# Patient Record
Sex: MALE | Race: WHITE | Employment: OTHER | URBAN - METROPOLITAN AREA
[De-identification: names, ages, dates, MRNs, and addresses within clinical notes are randomized per-mention and may not be internally consistent; named-entity substitution may affect disease eponyms.]

---

## 2017-09-22 PROBLEM — C79.9 METASTASIS (HCC): Status: ACTIVE | Noted: 2017-09-22

## 2017-09-22 PROBLEM — R91.8 LUNG MASS: Status: ACTIVE | Noted: 2017-09-22

## 2017-09-25 PROBLEM — J90 PLEURAL EFFUSION: Status: ACTIVE | Noted: 2017-09-25

## 2018-01-01 ENCOUNTER — OFFICE VISIT (OUTPATIENT)
Dept: ENDOCRINOLOGY | Age: 63
End: 2018-01-01
Payer: COMMERCIAL

## 2018-01-01 ENCOUNTER — HOSPITAL ENCOUNTER (OUTPATIENT)
Age: 63
Discharge: HOME | End: 2018-12-06
Payer: COMMERCIAL

## 2018-01-01 ENCOUNTER — TELEPHONE (OUTPATIENT)
Dept: ENDOCRINOLOGY | Age: 63
End: 2018-01-01

## 2018-01-01 VITALS
BODY MASS INDEX: 21.84 KG/M2 | WEIGHT: 156 LBS | SYSTOLIC BLOOD PRESSURE: 120 MMHG | HEIGHT: 71 IN | OXYGEN SATURATION: 98 % | RESPIRATION RATE: 16 BRPM | DIASTOLIC BLOOD PRESSURE: 82 MMHG | HEART RATE: 134 BPM

## 2018-01-01 DIAGNOSIS — E05.90 HYPERTHYROIDISM: ICD-10-CM

## 2018-01-01 DIAGNOSIS — E05.90 HYPERTHYROIDISM: Primary | ICD-10-CM

## 2018-01-01 LAB
T3 TOTAL: 129.4 NG/DL (ref 80–200)
T4 FREE: 1.02 NG/DL (ref 0.93–1.7)
T4 TOTAL: 9 MCG/DL (ref 4.5–11.7)
THYROGLOBULIN AB: 737.1 IU/ML (ref 0–4)
THYROID PEROXIDASE (TPO) ABS: 58.4 IU/ML (ref 0–9)
THYROID STIMULATING IMMUNOGLOBULIN: 87 %
TSH SERPL DL<=0.05 MIU/L-ACNC: 2.55 UIU/ML (ref 0.27–4.2)

## 2018-01-01 PROCEDURE — 86376 MICROSOMAL ANTIBODY EACH: CPT

## 2018-01-01 PROCEDURE — 99213 OFFICE O/P EST LOW 20 MIN: CPT | Performed by: INTERNAL MEDICINE

## 2018-01-01 PROCEDURE — 84480 ASSAY TRIIODOTHYRONINE (T3): CPT

## 2018-01-01 PROCEDURE — 86800 THYROGLOBULIN ANTIBODY: CPT

## 2018-01-01 PROCEDURE — 84443 ASSAY THYROID STIM HORMONE: CPT

## 2018-01-01 PROCEDURE — 84445 ASSAY OF TSI GLOBULIN: CPT

## 2018-01-01 PROCEDURE — 36415 COLL VENOUS BLD VENIPUNCTURE: CPT

## 2018-01-01 PROCEDURE — 84439 ASSAY OF FREE THYROXINE: CPT

## 2018-12-06 NOTE — LETTER
Patient reports feeling hot and Wt loss but denied palpitations, swelling in the area of the thyroid gland, nervousness, anxiety, irritability or tremor     Denied any previous h/o or family history of thyroid disease       PAST MEDICAL HISTORY   Past Medical History:   Diagnosis Date    CAD (coronary artery disease)     [facility]     Hyperlipidemia     Hypertension     [facility] ([facility]     MI (mitral incompetence)      PAST SURGICAL HISTORY   Past Surgical History:   Procedure Laterality Date    ANGIOPLASTY  03/2017    lower legs    CORONARY ANGIOPLASTY WITH STENT PLACEMENT  2009    stents placed    LUNG BIOPSY      THORACENTESIS Left 09/24/2017     SOCIAL HISTORY   Social History     Social History    Marital status: Single     Spouse name: N/A    Number of children: N/A    Years of education: N/A     Occupational History    Not on file.      Social History Main Topics    Smoking status: Former Smoker     Packs/day: 2.00     Years: 45.00     Types: Cigarettes     Start date: 9/23/1971     Quit date: 9/15/2017    Smokeless tobacco: Never Used    Alcohol use 3.6 oz/week     6 Cans of beer per week      Comment: wkly-no acohol in six weeks    Drug use: No    Sexual activity: Not on file     Other Topics Concern    Not on file     Social History Narrative    No narrative on file     FAMILY HISTORY   Family History   Problem Relation Age of Onset    Cancer Mother         colon     ALLERGIES AND DRUG REACTIONS   No Known Allergies    CURRENT MEDICATIONS     Current Outpatient Prescriptions   Medication Sig Dispense Refill    HYDROcodone-acetaminophen ([facility]) 5-325 MG per tablet take 1 tablet by mouth every 4 to 6 hours if needed for pain  0    aspirin 81 MG chewable tablet Take 81 mg by mouth daily      METOPROLOL SUCCINATE ER PO Take 25 mg by mouth 2 times daily       atorvastatin (LIPITOR) 40 MG tablet Take 40 mg by mouth daily

## 2018-12-06 NOTE — PROGRESS NOTES
/82   Pulse 134   Resp 16   Ht 5' 11\" (1.803 m)   Wt 156 lb (70.8 kg)   SpO2 98%   BMI 21.76 kg/m²   BP Readings from Last 4 Encounters:   12/06/18 120/82   10/19/17 110/60   10/12/17 (!) 145/85   09/25/17 119/69     Wt Readings from Last 6 Encounters:   12/06/18 156 lb (70.8 kg)   10/19/17 178 lb 12.8 oz (81.1 kg)   09/25/17 190 lb 14.4 oz (86.6 kg)   06/17/15 173 lb (78.5 kg)       Physical examination:  General: awake alert, oriented x3, no abnormal position or movements. HEENT: normocephalic non traumatic, no exophthalmos, no lid lag, no lid retraction   Neck: supple, no LN enlargement, no thyromegaly, no thyroid tenderness, no thyroid bruit, no JVD. Pulm: Clear equal air entry no added sounds, no wheezing or rhonchi    CVS: S1 + S2, no murmur, no heave. Dorsalis pedis pulse palpable   Abd: soft lax, no tenderness, no organomegaly, audible bowel sounds. Skin: warm, no lesions, no rash.  No palmar erythema, no onycholysis, no pretibial Myxoedema, no acropachy    Neuro: CN intact, sensation notmal , muscle power normal. No tremors   Psych: normal mood, and affect    Review of Laboratory Data:  I have reviewed the following:  10/2/2018: WBC 6.4, Plt 350, Hb 11.5,, Ca 9.2, Cr 0.77, ALT 26, AST 23, GFR >60  Lab Results   Component Value Date/Time    WBC 8.6 09/25/2017 04:59 AM    RBC 4.43 09/25/2017 04:59 AM    HGB 12.8 09/25/2017 04:59 AM    HCT 38.1 09/25/2017 04:59 AM    MCV 86.0 09/25/2017 04:59 AM    MCH 28.9 09/25/2017 04:59 AM    MCHC 33.6 09/25/2017 04:59 AM    RDW 11.7 09/25/2017 04:59 AM     09/25/2017 04:59 AM    MPV 9.0 09/25/2017 04:59 AM      Lab Results   Component Value Date/Time     09/25/2017 04:59 AM    K 3.5 09/25/2017 04:59 AM    CO2 27 09/25/2017 04:59 AM    BUN 6 (L) 09/25/2017 04:59 AM    CALCIUM 8.3 (L) 09/25/2017 04:59 AM      No results found for: TSH, T4FREE, G8XZOTU, FT3, Q3LCVQA, TSI, TPOABS, THGAB  Lab Results   Component Value Date    GLUCOSE 117

## 2019-01-01 ENCOUNTER — HOSPITAL ENCOUNTER (INPATIENT)
Age: 64
LOS: 4 days | DRG: 720 | End: 2019-05-14
Attending: EMERGENCY MEDICINE | Admitting: INTERNAL MEDICINE
Payer: COMMERCIAL

## 2019-01-01 ENCOUNTER — HOSPITAL ENCOUNTER (EMERGENCY)
Age: 64
Discharge: HOME | End: 2019-04-15
Attending: EMERGENCY MEDICINE
Payer: COMMERCIAL

## 2019-01-01 ENCOUNTER — ANESTHESIA EVENT (OUTPATIENT)
Dept: OPERATING ROOM | Age: 64
DRG: 314 | End: 2019-01-01
Payer: COMMERCIAL

## 2019-01-01 ENCOUNTER — HOSPITAL ENCOUNTER (INPATIENT)
Age: 64
LOS: 6 days | Discharge: HOME HEALTH | DRG: 314 | End: 2019-05-05
Attending: EMERGENCY MEDICINE | Admitting: INTERNAL MEDICINE
Payer: COMMERCIAL

## 2019-01-01 ENCOUNTER — APPOINTMENT (OUTPATIENT)
Dept: CT IMAGING | Age: 64
End: 2019-01-01
Payer: COMMERCIAL

## 2019-01-01 ENCOUNTER — APPOINTMENT (OUTPATIENT)
Dept: GENERAL RADIOLOGY | Age: 64
DRG: 314 | End: 2019-01-01
Payer: COMMERCIAL

## 2019-01-01 ENCOUNTER — APPOINTMENT (OUTPATIENT)
Dept: ULTRASOUND IMAGING | Age: 64
DRG: 720 | End: 2019-01-01
Payer: COMMERCIAL

## 2019-01-01 ENCOUNTER — ANESTHESIA (OUTPATIENT)
Dept: OPERATING ROOM | Age: 64
DRG: 314 | End: 2019-01-01
Payer: COMMERCIAL

## 2019-01-01 ENCOUNTER — APPOINTMENT (OUTPATIENT)
Dept: GENERAL RADIOLOGY | Age: 64
DRG: 720 | End: 2019-01-01
Payer: COMMERCIAL

## 2019-01-01 ENCOUNTER — APPOINTMENT (OUTPATIENT)
Dept: GENERAL RADIOLOGY | Age: 64
End: 2019-01-01
Payer: COMMERCIAL

## 2019-01-01 ENCOUNTER — APPOINTMENT (OUTPATIENT)
Dept: CT IMAGING | Age: 64
DRG: 314 | End: 2019-01-01
Payer: COMMERCIAL

## 2019-01-01 ENCOUNTER — TELEPHONE (OUTPATIENT)
Dept: FAMILY MEDICINE CLINIC | Age: 64
End: 2019-01-01

## 2019-01-01 VITALS
HEART RATE: 110 BPM | RESPIRATION RATE: 16 BRPM | WEIGHT: 144 LBS | HEIGHT: 72 IN | TEMPERATURE: 98.1 F | OXYGEN SATURATION: 99 % | BODY MASS INDEX: 19.5 KG/M2 | SYSTOLIC BLOOD PRESSURE: 113 MMHG | DIASTOLIC BLOOD PRESSURE: 74 MMHG

## 2019-01-01 VITALS
SYSTOLIC BLOOD PRESSURE: 92 MMHG | TEMPERATURE: 97.7 F | HEIGHT: 72 IN | OXYGEN SATURATION: 99 % | RESPIRATION RATE: 16 BRPM | DIASTOLIC BLOOD PRESSURE: 63 MMHG | HEART RATE: 78 BPM | WEIGHT: 174.3 LBS | BODY MASS INDEX: 23.61 KG/M2

## 2019-01-01 VITALS
HEIGHT: 72 IN | DIASTOLIC BLOOD PRESSURE: 56 MMHG | TEMPERATURE: 97.6 F | OXYGEN SATURATION: 96 % | WEIGHT: 180.7 LBS | SYSTOLIC BLOOD PRESSURE: 90 MMHG | BODY MASS INDEX: 24.47 KG/M2

## 2019-01-01 VITALS — DIASTOLIC BLOOD PRESSURE: 61 MMHG | OXYGEN SATURATION: 100 % | SYSTOLIC BLOOD PRESSURE: 95 MMHG

## 2019-01-01 DIAGNOSIS — R91.8 LUNG MASS: ICD-10-CM

## 2019-01-01 DIAGNOSIS — E86.0 DEHYDRATION: Primary | ICD-10-CM

## 2019-01-01 DIAGNOSIS — L03.115 CELLULITIS OF RIGHT LOWER EXTREMITY: Primary | ICD-10-CM

## 2019-01-01 DIAGNOSIS — A41.9 SEPTICEMIA (HCC): Primary | ICD-10-CM

## 2019-01-01 DIAGNOSIS — R53.1 GENERALIZED WEAKNESS: ICD-10-CM

## 2019-01-01 DIAGNOSIS — S98.131A AMPUTATED TOE OF RIGHT FOOT (HCC): Primary | ICD-10-CM

## 2019-01-01 DIAGNOSIS — E87.20 LACTIC ACID ACIDOSIS: ICD-10-CM

## 2019-01-01 DIAGNOSIS — E11.8 DIABETIC FOOT (HCC): ICD-10-CM

## 2019-01-01 DIAGNOSIS — R11.0 NAUSEA: ICD-10-CM

## 2019-01-01 DIAGNOSIS — E87.6 HYPOKALEMIA: ICD-10-CM

## 2019-01-01 LAB
ABO/RH: NORMAL
ALBUMIN SERPL-MCNC: 1.7 G/DL (ref 3.5–5.2)
ALBUMIN SERPL-MCNC: 1.8 G/DL (ref 3.5–5.2)
ALBUMIN SERPL-MCNC: 2.5 G/DL (ref 3.5–5.2)
ALBUMIN SERPL-MCNC: 2.9 G/DL (ref 3.5–5.2)
ALBUMIN SERPL-MCNC: 3.2 G/DL (ref 3.5–5.2)
ALP BLD-CCNC: 272 U/L (ref 40–129)
ALP BLD-CCNC: 284 U/L (ref 40–129)
ALP BLD-CCNC: 406 U/L (ref 40–129)
ALP BLD-CCNC: 412 U/L (ref 40–129)
ALP BLD-CCNC: 415 U/L (ref 40–129)
ALP BLD-CCNC: 483 U/L (ref 40–129)
ALP BLD-CCNC: 530 U/L (ref 40–129)
ALT SERPL-CCNC: 123 U/L (ref 0–40)
ALT SERPL-CCNC: 28 U/L (ref 0–40)
ALT SERPL-CCNC: 29 U/L (ref 0–40)
ALT SERPL-CCNC: 36 U/L (ref 0–40)
ALT SERPL-CCNC: 60 U/L (ref 0–40)
ALT SERPL-CCNC: 62 U/L (ref 0–40)
ALT SERPL-CCNC: 91 U/L (ref 0–40)
AMMONIA: 62.6 UMOL/L (ref 16–60)
ANAEROBIC CULTURE: NORMAL
ANION GAP SERPL CALCULATED.3IONS-SCNC: 10 MMOL/L (ref 7–16)
ANION GAP SERPL CALCULATED.3IONS-SCNC: 11 MMOL/L (ref 7–16)
ANION GAP SERPL CALCULATED.3IONS-SCNC: 13 MMOL/L (ref 7–16)
ANION GAP SERPL CALCULATED.3IONS-SCNC: 14 MMOL/L (ref 7–16)
ANION GAP SERPL CALCULATED.3IONS-SCNC: 15 MMOL/L (ref 7–16)
ANION GAP SERPL CALCULATED.3IONS-SCNC: 17 MMOL/L (ref 7–16)
ANION GAP SERPL CALCULATED.3IONS-SCNC: 8 MMOL/L (ref 7–16)
ANION GAP SERPL CALCULATED.3IONS-SCNC: 8 MMOL/L (ref 7–16)
ANISOCYTOSIS: ABNORMAL
ANTIBODY SCREEN: NORMAL
ANTISTREPTOLYSIN-O: 66 IU/ML (ref 0–200)
ANTISTREPTOLYSIN-O: 71 IU/ML (ref 0–200)
APTT: 120 SEC (ref 24.5–35.1)
APTT: 181.6 SEC (ref 24.5–35.1)
APTT: 44.3 SEC (ref 24.5–35.1)
APTT: 49.3 SEC (ref 24.5–35.1)
APTT: 56.3 SEC (ref 24.5–35.1)
APTT: >240 SEC (ref 24.5–35.1)
AST SERPL-CCNC: 120 U/L (ref 0–39)
AST SERPL-CCNC: 122 U/L (ref 0–39)
AST SERPL-CCNC: 172 U/L (ref 0–39)
AST SERPL-CCNC: 44 U/L (ref 0–39)
AST SERPL-CCNC: 47 U/L (ref 0–39)
AST SERPL-CCNC: 55 U/L (ref 0–39)
AST SERPL-CCNC: 99 U/L (ref 0–39)
BASOPHILIC STIPPLING: ABNORMAL
BASOPHILS ABSOLUTE: 0 E9/L (ref 0–0.2)
BASOPHILS ABSOLUTE: 0.01 E9/L (ref 0–0.2)
BASOPHILS ABSOLUTE: 0.04 E9/L (ref 0–0.2)
BASOPHILS ABSOLUTE: 0.05 E9/L (ref 0–0.2)
BASOPHILS ABSOLUTE: 0.33 E9/L (ref 0–0.2)
BASOPHILS RELATIVE PERCENT: 0 % (ref 0–2)
BASOPHILS RELATIVE PERCENT: 0.1 % (ref 0–2)
BASOPHILS RELATIVE PERCENT: 0.1 % (ref 0–2)
BASOPHILS RELATIVE PERCENT: 0.3 % (ref 0–2)
BASOPHILS RELATIVE PERCENT: 1.7 % (ref 0–2)
BILIRUB SERPL-MCNC: 1.2 MG/DL (ref 0–1.2)
BILIRUB SERPL-MCNC: 1.2 MG/DL (ref 0–1.2)
BILIRUB SERPL-MCNC: 1.5 MG/DL (ref 0–1.2)
BILIRUB SERPL-MCNC: 1.5 MG/DL (ref 0–1.2)
BILIRUB SERPL-MCNC: 1.6 MG/DL (ref 0–1.2)
BILIRUB SERPL-MCNC: 1.7 MG/DL (ref 0–1.2)
BILIRUB SERPL-MCNC: 2.2 MG/DL (ref 0–1.2)
BILIRUBIN DIRECT: 0.5 MG/DL (ref 0–0.3)
BILIRUBIN DIRECT: 0.8 MG/DL (ref 0–0.3)
BILIRUBIN DIRECT: 0.8 MG/DL (ref 0–0.3)
BILIRUBIN DIRECT: 0.9 MG/DL (ref 0–0.3)
BILIRUBIN DIRECT: 1.1 MG/DL (ref 0–0.3)
BILIRUBIN URINE: NEGATIVE
BILIRUBIN, INDIRECT: 0.4 MG/DL (ref 0–1)
BILIRUBIN, INDIRECT: 0.7 MG/DL (ref 0–1)
BILIRUBIN, INDIRECT: 1.1 MG/DL (ref 0–1)
BLASTS RELATIVE PERCENT: 0.9 % (ref 0–0)
BLOOD BANK DISPENSE STATUS: NORMAL
BLOOD BANK DISPENSE STATUS: NORMAL
BLOOD BANK PRODUCT CODE: NORMAL
BLOOD BANK PRODUCT CODE: NORMAL
BLOOD CULTURE, ROUTINE: NORMAL
BLOOD, URINE: NEGATIVE
BPU ID: NORMAL
BPU ID: NORMAL
BUN BLDV-MCNC: 10 MG/DL (ref 8–23)
BUN BLDV-MCNC: 12 MG/DL (ref 8–23)
BUN BLDV-MCNC: 37 MG/DL (ref 8–23)
BUN BLDV-MCNC: 39 MG/DL (ref 8–23)
BUN BLDV-MCNC: 40 MG/DL (ref 8–23)
BUN BLDV-MCNC: 44 MG/DL (ref 8–23)
BUN BLDV-MCNC: 8 MG/DL (ref 8–23)
BUN BLDV-MCNC: 9 MG/DL (ref 8–23)
BURR CELLS: ABNORMAL
C-REACTIVE PROTEIN: 10 MG/DL (ref 0–0.4)
C-REACTIVE PROTEIN: 7.4 MG/DL (ref 0–0.4)
C-REACTIVE PROTEIN: 9.4 MG/DL (ref 0–0.4)
CALCIUM IONIZED: 0.95 MMOL/L (ref 1.15–1.33)
CALCIUM SERPL-MCNC: 6.4 MG/DL (ref 8.6–10.2)
CALCIUM SERPL-MCNC: 6.5 MG/DL (ref 8.6–10.2)
CALCIUM SERPL-MCNC: 6.6 MG/DL (ref 8.6–10.2)
CALCIUM SERPL-MCNC: 6.7 MG/DL (ref 8.6–10.2)
CALCIUM SERPL-MCNC: 6.7 MG/DL (ref 8.6–10.2)
CALCIUM SERPL-MCNC: 6.9 MG/DL (ref 8.6–10.2)
CALCIUM SERPL-MCNC: 7 MG/DL (ref 8.6–10.2)
CALCIUM SERPL-MCNC: 7.1 MG/DL (ref 8.6–10.2)
CALCIUM SERPL-MCNC: 7.2 MG/DL (ref 8.6–10.2)
CALCIUM SERPL-MCNC: 7.4 MG/DL (ref 8.6–10.2)
CALCIUM SERPL-MCNC: 7.6 MG/DL (ref 8.6–10.2)
CALCIUM SERPL-MCNC: 7.9 MG/DL (ref 8.6–10.2)
CHLORIDE BLD-SCNC: 100 MMOL/L (ref 98–107)
CHLORIDE BLD-SCNC: 100 MMOL/L (ref 98–107)
CHLORIDE BLD-SCNC: 89 MMOL/L (ref 98–107)
CHLORIDE BLD-SCNC: 94 MMOL/L (ref 98–107)
CHLORIDE BLD-SCNC: 95 MMOL/L (ref 98–107)
CHLORIDE BLD-SCNC: 95 MMOL/L (ref 98–107)
CHLORIDE BLD-SCNC: 96 MMOL/L (ref 98–107)
CHLORIDE BLD-SCNC: 97 MMOL/L (ref 98–107)
CHLORIDE BLD-SCNC: 99 MMOL/L (ref 98–107)
CHLORIDE URINE RANDOM: <20 MMOL/L
CLARITY: CLEAR
CO2: 21 MMOL/L (ref 22–29)
CO2: 21 MMOL/L (ref 22–29)
CO2: 22 MMOL/L (ref 22–29)
CO2: 23 MMOL/L (ref 22–29)
CO2: 26 MMOL/L (ref 22–29)
CO2: 26 MMOL/L (ref 22–29)
CO2: 27 MMOL/L (ref 22–29)
CO2: 28 MMOL/L (ref 22–29)
CO2: 29 MMOL/L (ref 22–29)
CO2: 33 MMOL/L (ref 22–29)
COLOR: YELLOW
CREAT SERPL-MCNC: 0.8 MG/DL (ref 0.7–1.2)
CREAT SERPL-MCNC: 0.8 MG/DL (ref 0.7–1.2)
CREAT SERPL-MCNC: 0.9 MG/DL (ref 0.7–1.2)
CREAT SERPL-MCNC: 1 MG/DL (ref 0.7–1.2)
CREAT SERPL-MCNC: 1 MG/DL (ref 0.7–1.2)
CREAT SERPL-MCNC: 2.1 MG/DL (ref 0.7–1.2)
CREAT SERPL-MCNC: 2.2 MG/DL (ref 0.7–1.2)
CREAT SERPL-MCNC: 2.3 MG/DL (ref 0.7–1.2)
CREAT SERPL-MCNC: 2.7 MG/DL (ref 0.7–1.2)
CREATININE URINE: 119 MG/DL (ref 40–278)
CREATININE URINE: 120 MG/DL (ref 40–278)
CULTURE, BLOOD 2: NORMAL
D DIMER: 1937 NG/ML DDU
D DIMER: 922 NG/ML DDU
DESCRIPTION BLOOD BANK: NORMAL
DESCRIPTION BLOOD BANK: NORMAL
EKG ATRIAL RATE: 104 BPM
EKG ATRIAL RATE: 118 BPM
EKG ATRIAL RATE: 145 BPM
EKG P AXIS: 10 DEGREES
EKG P AXIS: 30 DEGREES
EKG P AXIS: 42 DEGREES
EKG P-R INTERVAL: 120 MS
EKG P-R INTERVAL: 134 MS
EKG P-R INTERVAL: 168 MS
EKG Q-T INTERVAL: 286 MS
EKG Q-T INTERVAL: 352 MS
EKG Q-T INTERVAL: 360 MS
EKG QRS DURATION: 68 MS
EKG QRS DURATION: 70 MS
EKG QRS DURATION: 74 MS
EKG QTC CALCULATION (BAZETT): 444 MS
EKG QTC CALCULATION (BAZETT): 473 MS
EKG QTC CALCULATION (BAZETT): 493 MS
EKG R AXIS: 39 DEGREES
EKG R AXIS: 41 DEGREES
EKG R AXIS: 56 DEGREES
EKG T AXIS: 79 DEGREES
EKG T AXIS: 95 DEGREES
EKG T AXIS: 97 DEGREES
EKG VENTRICULAR RATE: 104 BPM
EKG VENTRICULAR RATE: 118 BPM
EKG VENTRICULAR RATE: 145 BPM
EOSINOPHILS ABSOLUTE: 0 E9/L (ref 0.05–0.5)
EOSINOPHILS ABSOLUTE: 0.05 E9/L (ref 0.05–0.5)
EOSINOPHILS ABSOLUTE: 0.1 E9/L (ref 0.05–0.5)
EOSINOPHILS ABSOLUTE: 0.49 E9/L (ref 0.05–0.5)
EOSINOPHILS ABSOLUTE: 0.69 E9/L (ref 0.05–0.5)
EOSINOPHILS RELATIVE PERCENT: 0 % (ref 0–6)
EOSINOPHILS RELATIVE PERCENT: 0.3 % (ref 0–6)
EOSINOPHILS RELATIVE PERCENT: 1.4 % (ref 0–6)
EOSINOPHILS RELATIVE PERCENT: 1.4 % (ref 0–6)
EOSINOPHILS RELATIVE PERCENT: 1.7 % (ref 0–6)
FERRITIN: 3059 NG/ML
GFR AFRICAN AMERICAN: 29
GFR AFRICAN AMERICAN: 35
GFR AFRICAN AMERICAN: 37
GFR AFRICAN AMERICAN: 39
GFR AFRICAN AMERICAN: >60
GFR NON-AFRICAN AMERICAN: 24 ML/MIN/1.73
GFR NON-AFRICAN AMERICAN: 29 ML/MIN/1.73
GFR NON-AFRICAN AMERICAN: 30 ML/MIN/1.73
GFR NON-AFRICAN AMERICAN: 32 ML/MIN/1.73
GFR NON-AFRICAN AMERICAN: >60 ML/MIN/1.73
GLUCOSE BLD-MCNC: 103 MG/DL (ref 74–99)
GLUCOSE BLD-MCNC: 106 MG/DL (ref 74–99)
GLUCOSE BLD-MCNC: 106 MG/DL (ref 74–99)
GLUCOSE BLD-MCNC: 111 MG/DL (ref 74–99)
GLUCOSE BLD-MCNC: 126 MG/DL (ref 74–99)
GLUCOSE BLD-MCNC: 129 MG/DL (ref 74–99)
GLUCOSE BLD-MCNC: 137 MG/DL (ref 74–99)
GLUCOSE BLD-MCNC: 139 MG/DL (ref 74–99)
GLUCOSE BLD-MCNC: 89 MG/DL (ref 74–99)
GLUCOSE BLD-MCNC: 90 MG/DL (ref 74–99)
GLUCOSE BLD-MCNC: 91 MG/DL (ref 74–99)
GLUCOSE BLD-MCNC: 97 MG/DL (ref 74–99)
GLUCOSE URINE: NEGATIVE MG/DL
GRAM STAIN ORDERABLE: NORMAL
HAV IGM SER IA-ACNC: NORMAL
HBA1C MFR BLD: 4.1 % (ref 4–5.6)
HCT VFR BLD CALC: 24.2 % (ref 37–54)
HCT VFR BLD CALC: 24.5 % (ref 37–54)
HCT VFR BLD CALC: 24.6 % (ref 37–54)
HCT VFR BLD CALC: 26.4 % (ref 37–54)
HCT VFR BLD CALC: 30.6 % (ref 37–54)
HCT VFR BLD CALC: 33.4 % (ref 37–54)
HCT VFR BLD CALC: 34 % (ref 37–54)
HCT VFR BLD CALC: 34.9 % (ref 37–54)
HCT VFR BLD CALC: 35.1 % (ref 37–54)
HCT VFR BLD CALC: 36.4 % (ref 37–54)
HCT VFR BLD CALC: 36.6 % (ref 37–54)
HCT VFR BLD CALC: 36.7 % (ref 37–54)
HCT VFR BLD CALC: 37.5 % (ref 37–54)
HCT VFR BLD CALC: 38.1 % (ref 37–54)
HEMOCCULT STL QL: ABNORMAL
HEMOGLOBIN: 10.5 G/DL (ref 12.5–16.5)
HEMOGLOBIN: 10.6 G/DL (ref 12.5–16.5)
HEMOGLOBIN: 10.8 G/DL (ref 12.5–16.5)
HEMOGLOBIN: 10.9 G/DL (ref 12.5–16.5)
HEMOGLOBIN: 11.2 G/DL (ref 12.5–16.5)
HEMOGLOBIN: 11.3 G/DL (ref 12.5–16.5)
HEMOGLOBIN: 11.5 G/DL (ref 12.5–16.5)
HEMOGLOBIN: 11.7 G/DL (ref 12.5–16.5)
HEMOGLOBIN: 12.2 G/DL (ref 12.5–16.5)
HEMOGLOBIN: 7.5 G/DL (ref 12.5–16.5)
HEMOGLOBIN: 7.6 G/DL (ref 12.5–16.5)
HEMOGLOBIN: 7.9 G/DL (ref 12.5–16.5)
HEMOGLOBIN: 8.6 G/DL (ref 12.5–16.5)
HEMOGLOBIN: 9.7 G/DL (ref 12.5–16.5)
HEPATITIS B CORE IGM ANTIBODY: NORMAL
HEPATITIS B SURFACE ANTIGEN INTERPRETATION: NORMAL
HEPATITIS C ANTIBODY INTERPRETATION: NORMAL
HYPOCHROMIA: ABNORMAL
IMMATURE GRANULOCYTES #: 0.1 E9/L
IMMATURE GRANULOCYTES #: 0.42 E9/L
IMMATURE GRANULOCYTES #: 0.66 E9/L
IMMATURE GRANULOCYTES %: 1.2 % (ref 0–5)
IMMATURE GRANULOCYTES %: 1.7 % (ref 0–5)
IMMATURE GRANULOCYTES %: 2.7 % (ref 0–5)
INR BLD: 4.6
INR BLD: 5.5
INR BLD: 6.3
IRON SATURATION: 85 % (ref 20–55)
IRON: 45 MCG/DL (ref 59–158)
KETONES, URINE: NEGATIVE MG/DL
LACTIC ACID: 3.9 MMOL/L (ref 0.5–2.2)
LACTIC ACID: 4 MMOL/L (ref 0.5–2.2)
LACTIC ACID: 4.5 MMOL/L (ref 0.5–2.2)
LACTIC ACID: 4.9 MMOL/L (ref 0.5–2.2)
LACTIC ACID: 5 MMOL/L (ref 0.5–2.2)
LACTIC ACID: 5.1 MMOL/L (ref 0.5–2.2)
LEUKOCYTE ESTERASE, URINE: NEGATIVE
LV EF: 68 %
LVEF MODALITY: NORMAL
LYMPHOCYTES ABSOLUTE: 0.59 E9/L (ref 1.5–4)
LYMPHOCYTES ABSOLUTE: 0.73 E9/L (ref 1.5–4)
LYMPHOCYTES ABSOLUTE: 0.77 E9/L (ref 1.5–4)
LYMPHOCYTES ABSOLUTE: 0.77 E9/L (ref 1.5–4)
LYMPHOCYTES ABSOLUTE: 0.83 E9/L (ref 1.5–4)
LYMPHOCYTES ABSOLUTE: 1.05 E9/L (ref 1.5–4)
LYMPHOCYTES ABSOLUTE: 2.42 E9/L (ref 1.5–4)
LYMPHOCYTES RELATIVE PERCENT: 1.7 % (ref 20–42)
LYMPHOCYTES RELATIVE PERCENT: 19.9 % (ref 20–42)
LYMPHOCYTES RELATIVE PERCENT: 2.2 % (ref 20–42)
LYMPHOCYTES RELATIVE PERCENT: 3 % (ref 20–42)
LYMPHOCYTES RELATIVE PERCENT: 3.1 % (ref 20–42)
LYMPHOCYTES RELATIVE PERCENT: 3.5 % (ref 20–42)
LYMPHOCYTES RELATIVE PERCENT: 6.1 % (ref 20–42)
MAGNESIUM: 1.7 MG/DL (ref 1.6–2.6)
MAGNESIUM: 1.8 MG/DL (ref 1.6–2.6)
MCH RBC QN AUTO: 31.7 PG (ref 26–35)
MCH RBC QN AUTO: 31.8 PG (ref 26–35)
MCH RBC QN AUTO: 32 PG (ref 26–35)
MCH RBC QN AUTO: 32.2 PG (ref 26–35)
MCH RBC QN AUTO: 32.2 PG (ref 26–35)
MCH RBC QN AUTO: 32.4 PG (ref 26–35)
MCH RBC QN AUTO: 32.5 PG (ref 26–35)
MCH RBC QN AUTO: 32.6 PG (ref 26–35)
MCH RBC QN AUTO: 32.6 PG (ref 26–35)
MCH RBC QN AUTO: 33.1 PG (ref 26–35)
MCH RBC QN AUTO: 33.1 PG (ref 26–35)
MCHC RBC AUTO-ENTMCNC: 30.6 % (ref 32–34.5)
MCHC RBC AUTO-ENTMCNC: 30.6 % (ref 32–34.5)
MCHC RBC AUTO-ENTMCNC: 30.7 % (ref 32–34.5)
MCHC RBC AUTO-ENTMCNC: 30.9 % (ref 32–34.5)
MCHC RBC AUTO-ENTMCNC: 30.9 % (ref 32–34.5)
MCHC RBC AUTO-ENTMCNC: 31 % (ref 32–34.5)
MCHC RBC AUTO-ENTMCNC: 31.1 % (ref 32–34.5)
MCHC RBC AUTO-ENTMCNC: 31.3 % (ref 32–34.5)
MCHC RBC AUTO-ENTMCNC: 31.7 % (ref 32–34.5)
MCHC RBC AUTO-ENTMCNC: 31.7 % (ref 32–34.5)
MCHC RBC AUTO-ENTMCNC: 32.1 % (ref 32–34.5)
MCHC RBC AUTO-ENTMCNC: 32.5 % (ref 32–34.5)
MCHC RBC AUTO-ENTMCNC: 32.6 % (ref 32–34.5)
MCV RBC AUTO: 102.2 FL (ref 80–99.9)
MCV RBC AUTO: 102.7 FL (ref 80–99.9)
MCV RBC AUTO: 102.9 FL (ref 80–99.9)
MCV RBC AUTO: 103.7 FL (ref 80–99.9)
MCV RBC AUTO: 103.8 FL (ref 80–99.9)
MCV RBC AUTO: 104 FL (ref 80–99.9)
MCV RBC AUTO: 104 FL (ref 80–99.9)
MCV RBC AUTO: 104.4 FL (ref 80–99.9)
MCV RBC AUTO: 104.8 FL (ref 80–99.9)
MCV RBC AUTO: 105.2 FL (ref 80–99.9)
MCV RBC AUTO: 105.5 FL (ref 80–99.9)
MCV RBC AUTO: 99.6 FL (ref 80–99.9)
MCV RBC AUTO: 99.7 FL (ref 80–99.9)
MICROALBUMIN UR-MCNC: 98.5 MG/L
MICROALBUMIN/CREAT UR-RTO: 82.1 (ref 0–30)
MONOCYTES ABSOLUTE: 0.29 E9/L (ref 0.1–0.95)
MONOCYTES ABSOLUTE: 0.81 E9/L (ref 0.1–0.95)
MONOCYTES ABSOLUTE: 1.53 E9/L (ref 0.1–0.95)
MONOCYTES ABSOLUTE: 1.54 E9/L (ref 0.1–0.95)
MONOCYTES ABSOLUTE: 1.73 E9/L (ref 0.1–0.95)
MONOCYTES ABSOLUTE: 2.52 E9/L (ref 0.1–0.95)
MONOCYTES ABSOLUTE: 2.56 E9/L (ref 0.1–0.95)
MONOCYTES RELATIVE PERCENT: 1.7 % (ref 2–12)
MONOCYTES RELATIVE PERCENT: 13 % (ref 2–12)
MONOCYTES RELATIVE PERCENT: 3.5 % (ref 2–12)
MONOCYTES RELATIVE PERCENT: 5.1 % (ref 2–12)
MONOCYTES RELATIVE PERCENT: 6.7 % (ref 2–12)
MONOCYTES RELATIVE PERCENT: 7.9 % (ref 2–12)
MONOCYTES RELATIVE PERCENT: 7.9 % (ref 2–12)
MYELOCYTE PERCENT: 0.9 % (ref 0–0)
NEUTROPHILS ABSOLUTE: 16.55 E9/L (ref 1.8–7.3)
NEUTROPHILS ABSOLUTE: 16.7 E9/L (ref 1.8–7.3)
NEUTROPHILS ABSOLUTE: 2.48 E9/L (ref 1.8–7.3)
NEUTROPHILS ABSOLUTE: 30.5 E9/L (ref 1.8–7.3)
NEUTROPHILS ABSOLUTE: 33.65 E9/L (ref 1.8–7.3)
NEUTROPHILS ABSOLUTE: 36.27 E9/L (ref 1.8–7.3)
NEUTROPHILS ABSOLUTE: 36.39 E9/L (ref 1.8–7.3)
NEUTROPHILS RELATIVE PERCENT: 67.8 % (ref 43–80)
NEUTROPHILS RELATIVE PERCENT: 84 % (ref 43–80)
NEUTROPHILS RELATIVE PERCENT: 86 % (ref 43–80)
NEUTROPHILS RELATIVE PERCENT: 89 % (ref 43–80)
NEUTROPHILS RELATIVE PERCENT: 89.1 % (ref 43–80)
NEUTROPHILS RELATIVE PERCENT: 89.6 % (ref 43–80)
NEUTROPHILS RELATIVE PERCENT: 94.8 % (ref 43–80)
NITRITE, URINE: NEGATIVE
NUCLEATED RED BLOOD CELLS: 0 /100 WBC
ORGANISM: ABNORMAL
OSMOLALITY URINE: 392 MOSM/KG (ref 300–900)
OSMOLALITY: 284 MOSM/KG (ref 285–310)
OVALOCYTES: ABNORMAL
PAPPENHEIMER BODIES: ABNORMAL
PARATHYROID HORMONE INTACT: 483 PG/ML (ref 15–65)
PDW BLD-RTO: 17.4 FL (ref 11.5–15)
PDW BLD-RTO: 17.4 FL (ref 11.5–15)
PDW BLD-RTO: 17.5 FL (ref 11.5–15)
PDW BLD-RTO: 17.6 FL (ref 11.5–15)
PDW BLD-RTO: 17.6 FL (ref 11.5–15)
PDW BLD-RTO: 17.7 FL (ref 11.5–15)
PDW BLD-RTO: 17.7 FL (ref 11.5–15)
PDW BLD-RTO: 18 FL (ref 11.5–15)
PDW BLD-RTO: 18.2 FL (ref 11.5–15)
PDW BLD-RTO: 18.3 FL (ref 11.5–15)
PDW BLD-RTO: 18.4 FL (ref 11.5–15)
PDW BLD-RTO: 18.7 FL (ref 11.5–15)
PDW BLD-RTO: 19.8 FL (ref 11.5–15)
PH UA: 5 (ref 5–9)
PHOSPHORUS: 3.1 MG/DL (ref 2.5–4.5)
PHOSPHORUS: 3.4 MG/DL (ref 2.5–4.5)
PLATELET # BLD: 102 E9/L (ref 130–450)
PLATELET # BLD: 158 E9/L (ref 130–450)
PLATELET # BLD: 237 E9/L (ref 130–450)
PLATELET # BLD: 243 E9/L (ref 130–450)
PLATELET # BLD: 263 E9/L (ref 130–450)
PLATELET # BLD: 271 E9/L (ref 130–450)
PLATELET # BLD: 277 E9/L (ref 130–450)
PLATELET # BLD: 279 E9/L (ref 130–450)
PLATELET # BLD: 291 E9/L (ref 130–450)
PLATELET # BLD: 303 E9/L (ref 130–450)
PLATELET # BLD: 45 E9/L (ref 130–450)
PLATELET # BLD: 70 E9/L (ref 130–450)
PLATELET # BLD: 73 E9/L (ref 130–450)
PLATELET CONFIRMATION: NORMAL
PMV BLD AUTO: 10.3 FL (ref 7–12)
PMV BLD AUTO: 10.8 FL (ref 7–12)
PMV BLD AUTO: 10.8 FL (ref 7–12)
PMV BLD AUTO: 11.1 FL (ref 7–12)
PMV BLD AUTO: 11.2 FL (ref 7–12)
PMV BLD AUTO: 11.3 FL (ref 7–12)
PMV BLD AUTO: 11.3 FL (ref 7–12)
PMV BLD AUTO: 11.4 FL (ref 7–12)
PMV BLD AUTO: 12.6 FL (ref 7–12)
PMV BLD AUTO: 12.8 FL (ref 7–12)
PMV BLD AUTO: 13 FL (ref 7–12)
PMV BLD AUTO: 13 FL (ref 7–12)
PMV BLD AUTO: 9 FL (ref 7–12)
POIKILOCYTES: ABNORMAL
POLYCHROMASIA: ABNORMAL
POLYCHROMASIA: ABNORMAL
POTASSIUM REFLEX MAGNESIUM: 3.2 MMOL/L (ref 3.5–5)
POTASSIUM REFLEX MAGNESIUM: 3.8 MMOL/L (ref 3.5–5)
POTASSIUM REFLEX MAGNESIUM: 3.9 MMOL/L (ref 3.5–5)
POTASSIUM REFLEX MAGNESIUM: 4.1 MMOL/L (ref 3.5–5)
POTASSIUM REFLEX MAGNESIUM: 5.2 MMOL/L (ref 3.5–5)
POTASSIUM SERPL-SCNC: 2.9 MMOL/L (ref 3.5–5)
POTASSIUM SERPL-SCNC: 3 MMOL/L (ref 3.5–5)
POTASSIUM SERPL-SCNC: 3.1 MMOL/L (ref 3.5–5)
POTASSIUM SERPL-SCNC: 3.4 MMOL/L (ref 3.5–5)
POTASSIUM SERPL-SCNC: 3.4 MMOL/L (ref 3.5–5)
POTASSIUM SERPL-SCNC: 3.7 MMOL/L (ref 3.5–5)
POTASSIUM SERPL-SCNC: 3.8 MMOL/L (ref 3.5–5)
POTASSIUM SERPL-SCNC: 3.8 MMOL/L (ref 3.5–5)
POTASSIUM SERPL-SCNC: 3.9 MMOL/L (ref 3.5–5)
POTASSIUM, UR: 51.4 MMOL/L
PRO-BNP: 1089 PG/ML (ref 0–125)
PRO-BNP: 517 PG/ML (ref 0–125)
PRO-BNP: 786 PG/ML (ref 0–125)
PROCALCITONIN: 2.42 NG/ML (ref 0–0.08)
PROTEIN UA: NEGATIVE MG/DL
PROTHROMBIN TIME: 52.1 SEC (ref 9.3–12.4)
PROTHROMBIN TIME: 61.2 SEC (ref 9.3–12.4)
PROTHROMBIN TIME: 70 SEC (ref 9.3–12.4)
RBC # BLD: 2.33 E12/L (ref 3.8–5.8)
RBC # BLD: 2.39 E12/L (ref 3.8–5.8)
RBC # BLD: 2.65 E12/L (ref 3.8–5.8)
RBC # BLD: 2.98 E12/L (ref 3.8–5.8)
RBC # BLD: 3.2 E12/L (ref 3.8–5.8)
RBC # BLD: 3.28 E12/L (ref 3.8–5.8)
RBC # BLD: 3.33 E12/L (ref 3.8–5.8)
RBC # BLD: 3.38 E12/L (ref 3.8–5.8)
RBC # BLD: 3.52 E12/L (ref 3.8–5.8)
RBC # BLD: 3.53 E12/L (ref 3.8–5.8)
RBC # BLD: 3.56 E12/L (ref 3.8–5.8)
RBC # BLD: 3.61 E12/L (ref 3.8–5.8)
RBC # BLD: 3.76 E12/L (ref 3.8–5.8)
REASON FOR REJECTION: NORMAL
REASON FOR REJECTION: NORMAL
REJECTED TEST: NORMAL
REJECTED TEST: NORMAL
SCHISTOCYTES: ABNORMAL
SEDIMENTATION RATE, ERYTHROCYTE: 0 MM/HR (ref 0–15)
SEDIMENTATION RATE, ERYTHROCYTE: 11 MM/HR (ref 0–15)
SEDIMENTATION RATE, ERYTHROCYTE: 2 MM/HR (ref 0–15)
SODIUM BLD-SCNC: 126 MMOL/L (ref 132–146)
SODIUM BLD-SCNC: 129 MMOL/L (ref 132–146)
SODIUM BLD-SCNC: 131 MMOL/L (ref 132–146)
SODIUM BLD-SCNC: 131 MMOL/L (ref 132–146)
SODIUM BLD-SCNC: 132 MMOL/L (ref 132–146)
SODIUM BLD-SCNC: 132 MMOL/L (ref 132–146)
SODIUM BLD-SCNC: 133 MMOL/L (ref 132–146)
SODIUM BLD-SCNC: 135 MMOL/L (ref 132–146)
SODIUM BLD-SCNC: 136 MMOL/L (ref 132–146)
SODIUM BLD-SCNC: 137 MMOL/L (ref 132–146)
SODIUM BLD-SCNC: 137 MMOL/L (ref 132–146)
SODIUM BLD-SCNC: 138 MMOL/L (ref 132–146)
SODIUM URINE: <20 MMOL/L
SPECIFIC GRAVITY UA: 1.01 (ref 1–1.03)
TOTAL IRON BINDING CAPACITY: 53 MCG/DL (ref 250–450)
TOTAL PROTEIN: 3.8 G/DL (ref 6.4–8.3)
TOTAL PROTEIN: 3.8 G/DL (ref 6.4–8.3)
TOTAL PROTEIN: 3.9 G/DL (ref 6.4–8.3)
TOTAL PROTEIN: 4.2 G/DL (ref 6.4–8.3)
TOTAL PROTEIN: 4.4 G/DL (ref 6.4–8.3)
TOTAL PROTEIN: 4.5 G/DL (ref 6.4–8.3)
TOTAL PROTEIN: 4.7 G/DL (ref 6.4–8.3)
TROPONIN: <0.01 NG/ML (ref 0–0.03)
UREA NITROGEN, UR: 500 MG/DL (ref 800–1666)
UROBILINOGEN, URINE: 0.2 E.U./DL
VANCOMYCIN TROUGH: 18.9 MCG/ML (ref 5–16)
WBC # BLD: 19.2 E9/L (ref 4.5–11.5)
WBC # BLD: 19.7 E9/L (ref 4.5–11.5)
WBC # BLD: 23.5 E9/L (ref 4.5–11.5)
WBC # BLD: 25.8 E9/L (ref 4.5–11.5)
WBC # BLD: 28.2 E9/L (ref 4.5–11.5)
WBC # BLD: 3.7 E9/L (ref 4.5–11.5)
WBC # BLD: 30.4 E9/L (ref 4.5–11.5)
WBC # BLD: 30.4 E9/L (ref 4.5–11.5)
WBC # BLD: 30.5 E9/L (ref 4.5–11.5)
WBC # BLD: 34.2 E9/L (ref 4.5–11.5)
WBC # BLD: 37.8 E9/L (ref 4.5–11.5)
WBC # BLD: 38.3 E9/L (ref 4.5–11.5)
WBC # BLD: 40.3 E9/L (ref 4.5–11.5)
WOUND/ABSCESS: ABNORMAL
WOUND/ABSCESS: ABNORMAL

## 2019-01-01 PROCEDURE — 6370000000 HC RX 637 (ALT 250 FOR IP): Performed by: INTERNAL MEDICINE

## 2019-01-01 PROCEDURE — 71045 X-RAY EXAM CHEST 1 VIEW: CPT

## 2019-01-01 PROCEDURE — 99285 EMERGENCY DEPT VISIT HI MDM: CPT

## 2019-01-01 PROCEDURE — 99232 SBSQ HOSP IP/OBS MODERATE 35: CPT | Performed by: INTERNAL MEDICINE

## 2019-01-01 PROCEDURE — 2500000003 HC RX 250 WO HCPCS: Performed by: PODIATRIST

## 2019-01-01 PROCEDURE — 86060 ANTISTREPTOLYSIN O TITER: CPT

## 2019-01-01 PROCEDURE — 6370000000 HC RX 637 (ALT 250 FOR IP): Performed by: EMERGENCY MEDICINE

## 2019-01-01 PROCEDURE — 6360000002 HC RX W HCPCS: Performed by: INTERNAL MEDICINE

## 2019-01-01 PROCEDURE — 80076 HEPATIC FUNCTION PANEL: CPT

## 2019-01-01 PROCEDURE — 36591 DRAW BLOOD OFF VENOUS DEVICE: CPT

## 2019-01-01 PROCEDURE — 87205 SMEAR GRAM STAIN: CPT

## 2019-01-01 PROCEDURE — 36415 COLL VENOUS BLD VENIPUNCTURE: CPT

## 2019-01-01 PROCEDURE — 82248 BILIRUBIN DIRECT: CPT

## 2019-01-01 PROCEDURE — 85378 FIBRIN DEGRADE SEMIQUANT: CPT

## 2019-01-01 PROCEDURE — 99223 1ST HOSP IP/OBS HIGH 75: CPT | Performed by: INTERNAL MEDICINE

## 2019-01-01 PROCEDURE — 2580000003 HC RX 258: Performed by: INTERNAL MEDICINE

## 2019-01-01 PROCEDURE — 76700 US EXAM ABDOM COMPLETE: CPT

## 2019-01-01 PROCEDURE — 85025 COMPLETE CBC W/AUTO DIFF WBC: CPT

## 2019-01-01 PROCEDURE — 99233 SBSQ HOSP IP/OBS HIGH 50: CPT | Performed by: HOSPITALIST

## 2019-01-01 PROCEDURE — 1200000000 HC SEMI PRIVATE

## 2019-01-01 PROCEDURE — 2580000003 HC RX 258: Performed by: EMERGENCY MEDICINE

## 2019-01-01 PROCEDURE — 86901 BLOOD TYPING SEROLOGIC RH(D): CPT

## 2019-01-01 PROCEDURE — 85651 RBC SED RATE NONAUTOMATED: CPT

## 2019-01-01 PROCEDURE — 84100 ASSAY OF PHOSPHORUS: CPT

## 2019-01-01 PROCEDURE — 83880 ASSAY OF NATRIURETIC PEPTIDE: CPT

## 2019-01-01 PROCEDURE — 80053 COMPREHEN METABOLIC PANEL: CPT

## 2019-01-01 PROCEDURE — 80048 BASIC METABOLIC PNL TOTAL CA: CPT

## 2019-01-01 PROCEDURE — 93306 TTE W/DOPPLER COMPLETE: CPT

## 2019-01-01 PROCEDURE — 6370000000 HC RX 637 (ALT 250 FOR IP): Performed by: SPECIALIST

## 2019-01-01 PROCEDURE — 87075 CULTR BACTERIA EXCEPT BLOOD: CPT

## 2019-01-01 PROCEDURE — 96365 THER/PROPH/DIAG IV INF INIT: CPT

## 2019-01-01 PROCEDURE — 93005 ELECTROCARDIOGRAM TRACING: CPT | Performed by: INTERNAL MEDICINE

## 2019-01-01 PROCEDURE — 73620 X-RAY EXAM OF FOOT: CPT

## 2019-01-01 PROCEDURE — 85027 COMPLETE CBC AUTOMATED: CPT

## 2019-01-01 PROCEDURE — 94760 N-INVAS EAR/PLS OXIMETRY 1: CPT

## 2019-01-01 PROCEDURE — 2500000003 HC RX 250 WO HCPCS: Performed by: INTERNAL MEDICINE

## 2019-01-01 PROCEDURE — 83605 ASSAY OF LACTIC ACID: CPT

## 2019-01-01 PROCEDURE — 82044 UR ALBUMIN SEMIQUANTITATIVE: CPT

## 2019-01-01 PROCEDURE — 6360000002 HC RX W HCPCS: Performed by: SPECIALIST

## 2019-01-01 PROCEDURE — 6360000002 HC RX W HCPCS: Performed by: ANESTHESIOLOGY

## 2019-01-01 PROCEDURE — 83735 ASSAY OF MAGNESIUM: CPT

## 2019-01-01 PROCEDURE — 2580000003 HC RX 258: Performed by: SPECIALIST

## 2019-01-01 PROCEDURE — 3600000002 HC SURGERY LEVEL 2 BASE: Performed by: PODIATRIST

## 2019-01-01 PROCEDURE — 2060000000 HC ICU INTERMEDIATE R&B

## 2019-01-01 PROCEDURE — 80074 ACUTE HEPATITIS PANEL: CPT

## 2019-01-01 PROCEDURE — 84300 ASSAY OF URINE SODIUM: CPT

## 2019-01-01 PROCEDURE — 83550 IRON BINDING TEST: CPT

## 2019-01-01 PROCEDURE — 6360000002 HC RX W HCPCS: Performed by: EMERGENCY MEDICINE

## 2019-01-01 PROCEDURE — 83970 ASSAY OF PARATHORMONE: CPT

## 2019-01-01 PROCEDURE — 86140 C-REACTIVE PROTEIN: CPT

## 2019-01-01 PROCEDURE — P9059 PLASMA, FRZ BETWEEN 8-24HOUR: HCPCS

## 2019-01-01 PROCEDURE — 85730 THROMBOPLASTIN TIME PARTIAL: CPT

## 2019-01-01 PROCEDURE — 83036 HEMOGLOBIN GLYCOSYLATED A1C: CPT

## 2019-01-01 PROCEDURE — 85610 PROTHROMBIN TIME: CPT

## 2019-01-01 PROCEDURE — 82330 ASSAY OF CALCIUM: CPT

## 2019-01-01 PROCEDURE — 83540 ASSAY OF IRON: CPT

## 2019-01-01 PROCEDURE — 82728 ASSAY OF FERRITIN: CPT

## 2019-01-01 PROCEDURE — 93010 ELECTROCARDIOGRAM REPORT: CPT | Performed by: INTERNAL MEDICINE

## 2019-01-01 PROCEDURE — 97161 PT EVAL LOW COMPLEX 20 MIN: CPT

## 2019-01-01 PROCEDURE — 82570 ASSAY OF URINE CREATININE: CPT

## 2019-01-01 PROCEDURE — 7100000010 HC PHASE II RECOVERY - FIRST 15 MIN: Performed by: PODIATRIST

## 2019-01-01 PROCEDURE — 84484 ASSAY OF TROPONIN QUANT: CPT

## 2019-01-01 PROCEDURE — 82140 ASSAY OF AMMONIA: CPT

## 2019-01-01 PROCEDURE — 86923 COMPATIBILITY TEST ELECTRIC: CPT

## 2019-01-01 PROCEDURE — 73630 X-RAY EXAM OF FOOT: CPT

## 2019-01-01 PROCEDURE — 3700000001 HC ADD 15 MINUTES (ANESTHESIA): Performed by: PODIATRIST

## 2019-01-01 PROCEDURE — 81003 URINALYSIS AUTO W/O SCOPE: CPT

## 2019-01-01 PROCEDURE — 2580000003 HC RX 258: Performed by: NURSE PRACTITIONER

## 2019-01-01 PROCEDURE — 97165 OT EVAL LOW COMPLEX 30 MIN: CPT

## 2019-01-01 PROCEDURE — 85018 HEMOGLOBIN: CPT

## 2019-01-01 PROCEDURE — P9047 ALBUMIN (HUMAN), 25%, 50ML: HCPCS | Performed by: INTERNAL MEDICINE

## 2019-01-01 PROCEDURE — C9113 INJ PANTOPRAZOLE SODIUM, VIA: HCPCS | Performed by: INTERNAL MEDICINE

## 2019-01-01 PROCEDURE — 2700000000 HC OXYGEN THERAPY PER DAY

## 2019-01-01 PROCEDURE — 82436 ASSAY OF URINE CHLORIDE: CPT

## 2019-01-01 PROCEDURE — P9016 RBC LEUKOCYTES REDUCED: HCPCS

## 2019-01-01 PROCEDURE — 83935 ASSAY OF URINE OSMOLALITY: CPT

## 2019-01-01 PROCEDURE — 7100000011 HC PHASE II RECOVERY - ADDTL 15 MIN: Performed by: PODIATRIST

## 2019-01-01 PROCEDURE — 2500000003 HC RX 250 WO HCPCS: Performed by: ANESTHESIOLOGY

## 2019-01-01 PROCEDURE — 36430 TRANSFUSION BLD/BLD COMPNT: CPT

## 2019-01-01 PROCEDURE — 93970 EXTREMITY STUDY: CPT

## 2019-01-01 PROCEDURE — 3600000012 HC SURGERY LEVEL 2 ADDTL 15MIN: Performed by: PODIATRIST

## 2019-01-01 PROCEDURE — 86900 BLOOD TYPING SEROLOGIC ABO: CPT

## 2019-01-01 PROCEDURE — 6360000002 HC RX W HCPCS: Performed by: NURSE ANESTHETIST, CERTIFIED REGISTERED

## 2019-01-01 PROCEDURE — 87040 BLOOD CULTURE FOR BACTERIA: CPT

## 2019-01-01 PROCEDURE — 99222 1ST HOSP IP/OBS MODERATE 55: CPT | Performed by: INTERNAL MEDICINE

## 2019-01-01 PROCEDURE — 87070 CULTURE OTHR SPECIMN AEROBIC: CPT

## 2019-01-01 PROCEDURE — APPSS30 APP SPLIT SHARED TIME 16-30 MINUTES: Performed by: NURSE PRACTITIONER

## 2019-01-01 PROCEDURE — 85014 HEMATOCRIT: CPT

## 2019-01-01 PROCEDURE — 94761 N-INVAS EAR/PLS OXIMETRY MLT: CPT

## 2019-01-01 PROCEDURE — 0Y6P0Z0 DETACHMENT AT RIGHT 1ST TOE, COMPLETE, OPEN APPROACH: ICD-10-PCS | Performed by: PODIATRIST

## 2019-01-01 PROCEDURE — 93005 ELECTROCARDIOGRAM TRACING: CPT | Performed by: EMERGENCY MEDICINE

## 2019-01-01 PROCEDURE — 2580000003 HC RX 258: Performed by: ANESTHESIOLOGY

## 2019-01-01 PROCEDURE — 6370000000 HC RX 637 (ALT 250 FOR IP)

## 2019-01-01 PROCEDURE — 6360000004 HC RX CONTRAST MEDICATION: Performed by: RADIOLOGY

## 2019-01-01 PROCEDURE — 99239 HOSP IP/OBS DSCHRG MGMT >30: CPT | Performed by: INTERNAL MEDICINE

## 2019-01-01 PROCEDURE — 71275 CT ANGIOGRAPHY CHEST: CPT

## 2019-01-01 PROCEDURE — 99999 PR OFFICE/OUTPT VISIT,PROCEDURE ONLY: CPT | Performed by: INTERNAL MEDICINE

## 2019-01-01 PROCEDURE — 86850 RBC ANTIBODY SCREEN: CPT

## 2019-01-01 PROCEDURE — 88305 TISSUE EXAM BY PATHOLOGIST: CPT

## 2019-01-01 PROCEDURE — 84540 ASSAY OF URINE/UREA-N: CPT

## 2019-01-01 PROCEDURE — 84133 ASSAY OF URINE POTASSIUM: CPT

## 2019-01-01 PROCEDURE — 84145 PROCALCITONIN (PCT): CPT

## 2019-01-01 PROCEDURE — 71260 CT THORAX DX C+: CPT

## 2019-01-01 PROCEDURE — 96375 TX/PRO/DX INJ NEW DRUG ADDON: CPT

## 2019-01-01 PROCEDURE — 99233 SBSQ HOSP IP/OBS HIGH 50: CPT | Performed by: INTERNAL MEDICINE

## 2019-01-01 PROCEDURE — 3700000000 HC ANESTHESIA ATTENDED CARE: Performed by: PODIATRIST

## 2019-01-01 PROCEDURE — 2709999900 HC NON-CHARGEABLE SUPPLY: Performed by: PODIATRIST

## 2019-01-01 PROCEDURE — 88311 DECALCIFY TISSUE: CPT

## 2019-01-01 PROCEDURE — 83930 ASSAY OF BLOOD OSMOLALITY: CPT

## 2019-01-01 PROCEDURE — 80202 ASSAY OF VANCOMYCIN: CPT

## 2019-01-01 PROCEDURE — C1729 CATH, DRAINAGE: HCPCS | Performed by: PODIATRIST

## 2019-01-01 RX ORDER — SODIUM CHLORIDE 0.9 % (FLUSH) 0.9 %
10 SYRINGE (ML) INJECTION PRN
Status: DISCONTINUED | OUTPATIENT
Start: 2019-01-01 | End: 2019-01-01 | Stop reason: HOSPADM

## 2019-01-01 RX ORDER — POTASSIUM CHLORIDE 20 MEQ/1
40 TABLET, EXTENDED RELEASE ORAL PRN
Status: DISCONTINUED | OUTPATIENT
Start: 2019-01-01 | End: 2019-01-01 | Stop reason: HOSPADM

## 2019-01-01 RX ORDER — 0.9 % SODIUM CHLORIDE 0.9 %
250 INTRAVENOUS SOLUTION INTRAVENOUS ONCE
Status: COMPLETED | OUTPATIENT
Start: 2019-01-01 | End: 2019-01-01

## 2019-01-01 RX ORDER — 0.9 % SODIUM CHLORIDE 0.9 %
1000 INTRAVENOUS SOLUTION INTRAVENOUS ONCE
Status: COMPLETED | OUTPATIENT
Start: 2019-01-01 | End: 2019-01-01

## 2019-01-01 RX ORDER — MEPERIDINE HYDROCHLORIDE 25 MG/ML
12.5 INJECTION INTRAMUSCULAR; INTRAVENOUS; SUBCUTANEOUS EVERY 5 MIN PRN
Status: DISCONTINUED | OUTPATIENT
Start: 2019-01-01 | End: 2019-01-01 | Stop reason: HOSPADM

## 2019-01-01 RX ORDER — ONDANSETRON 2 MG/ML
4 INJECTION INTRAMUSCULAR; INTRAVENOUS EVERY 6 HOURS PRN
Status: DISCONTINUED | OUTPATIENT
Start: 2019-01-01 | End: 2019-01-01 | Stop reason: HOSPADM

## 2019-01-01 RX ORDER — DOXYCYCLINE HYCLATE 100 MG/1
100 CAPSULE ORAL EVERY 12 HOURS SCHEDULED
Status: DISCONTINUED | OUTPATIENT
Start: 2019-01-01 | End: 2019-01-01 | Stop reason: HOSPADM

## 2019-01-01 RX ORDER — LORAZEPAM 2 MG/ML
0.5 INJECTION INTRAMUSCULAR
Status: DISCONTINUED | OUTPATIENT
Start: 2019-01-01 | End: 2019-01-01 | Stop reason: HOSPADM

## 2019-01-01 RX ORDER — SODIUM CHLORIDE 9 MG/ML
INJECTION, SOLUTION INTRAVENOUS CONTINUOUS
Status: ACTIVE | OUTPATIENT
Start: 2019-01-01 | End: 2019-01-01

## 2019-01-01 RX ORDER — GABAPENTIN 300 MG/1
1 CAPSULE ORAL NIGHTLY
Refills: 0 | COMMUNITY
Start: 2019-01-01

## 2019-01-01 RX ORDER — 0.9 % SODIUM CHLORIDE 0.9 %
2000 INTRAVENOUS SOLUTION INTRAVENOUS ONCE
Status: COMPLETED | OUTPATIENT
Start: 2019-01-01 | End: 2019-01-01

## 2019-01-01 RX ORDER — PROMETHAZINE HYDROCHLORIDE 25 MG/ML
25 INJECTION, SOLUTION INTRAMUSCULAR; INTRAVENOUS PRN
Status: DISCONTINUED | OUTPATIENT
Start: 2019-01-01 | End: 2019-01-01 | Stop reason: HOSPADM

## 2019-01-01 RX ORDER — NITROGLYCERIN 0.4 MG/1
TABLET SUBLINGUAL
Refills: 0 | COMMUNITY
Start: 2019-01-01

## 2019-01-01 RX ORDER — OXYCODONE HYDROCHLORIDE 10 MG/1
1 TABLET ORAL
Refills: 0 | COMMUNITY
Start: 2019-01-01

## 2019-01-01 RX ORDER — GABAPENTIN 300 MG/1
300 CAPSULE ORAL NIGHTLY
Status: DISCONTINUED | OUTPATIENT
Start: 2019-01-01 | End: 2019-01-01 | Stop reason: HOSPADM

## 2019-01-01 RX ORDER — 0.9 % SODIUM CHLORIDE 0.9 %
200 INTRAVENOUS SOLUTION INTRAVENOUS ONCE
Status: COMPLETED | OUTPATIENT
Start: 2019-01-01 | End: 2019-01-01

## 2019-01-01 RX ORDER — LABETALOL HYDROCHLORIDE 5 MG/ML
5 INJECTION, SOLUTION INTRAVENOUS EVERY 10 MIN PRN
Status: DISCONTINUED | OUTPATIENT
Start: 2019-01-01 | End: 2019-01-01 | Stop reason: HOSPADM

## 2019-01-01 RX ORDER — ACETAMINOPHEN 325 MG/1
650 TABLET ORAL EVERY 4 HOURS PRN
Status: DISCONTINUED | OUTPATIENT
Start: 2019-01-01 | End: 2019-01-01 | Stop reason: HOSPADM

## 2019-01-01 RX ORDER — DIMETHICONE, OXYBENZONE, AND PADIMATE O 2; 2.5; 6.6 G/100G; G/100G; G/100G
STICK TOPICAL
Status: COMPLETED
Start: 2019-01-01 | End: 2019-01-01

## 2019-01-01 RX ORDER — GABAPENTIN 300 MG/1
300 CAPSULE ORAL NIGHTLY
Status: DISCONTINUED | OUTPATIENT
Start: 2019-01-01 | End: 2019-01-01

## 2019-01-01 RX ORDER — SODIUM CHLORIDE 450 MG/100ML
INJECTION, SOLUTION INTRAVENOUS CONTINUOUS
Status: DISCONTINUED | OUTPATIENT
Start: 2019-01-01 | End: 2019-01-01

## 2019-01-01 RX ORDER — POTASSIUM CHLORIDE 20 MEQ/1
40 TABLET, EXTENDED RELEASE ORAL ONCE
Status: COMPLETED | OUTPATIENT
Start: 2019-01-01 | End: 2019-01-01

## 2019-01-01 RX ORDER — 0.9 % SODIUM CHLORIDE 0.9 %
1000 INTRAVENOUS SOLUTION INTRAVENOUS ONCE
Status: DISCONTINUED | OUTPATIENT
Start: 2019-01-01 | End: 2019-01-01

## 2019-01-01 RX ORDER — MAGNESIUM SULFATE 1 G/100ML
1 INJECTION INTRAVENOUS PRN
Status: DISCONTINUED | OUTPATIENT
Start: 2019-01-01 | End: 2019-01-01 | Stop reason: HOSPADM

## 2019-01-01 RX ORDER — FUROSEMIDE 40 MG/1
1 TABLET ORAL EVERY MORNING
Refills: 0 | COMMUNITY
Start: 2019-01-01

## 2019-01-01 RX ORDER — ATORVASTATIN CALCIUM 40 MG/1
40 TABLET, FILM COATED ORAL NIGHTLY
Status: DISCONTINUED | OUTPATIENT
Start: 2019-01-01 | End: 2019-01-01 | Stop reason: HOSPADM

## 2019-01-01 RX ORDER — OXYCODONE HYDROCHLORIDE 5 MG/1
10 TABLET ORAL EVERY 4 HOURS PRN
Status: DISCONTINUED | OUTPATIENT
Start: 2019-01-01 | End: 2019-01-01 | Stop reason: HOSPADM

## 2019-01-01 RX ORDER — 0.9 % SODIUM CHLORIDE 0.9 %
30 INTRAVENOUS SOLUTION INTRAVENOUS ONCE
Status: COMPLETED | OUTPATIENT
Start: 2019-01-01 | End: 2019-01-01

## 2019-01-01 RX ORDER — 0.9 % SODIUM CHLORIDE 0.9 %
500 INTRAVENOUS SOLUTION INTRAVENOUS ONCE
Status: COMPLETED | OUTPATIENT
Start: 2019-01-01 | End: 2019-01-01

## 2019-01-01 RX ORDER — ALBUMIN (HUMAN) 12.5 G/50ML
25 SOLUTION INTRAVENOUS EVERY 6 HOURS
Status: COMPLETED | OUTPATIENT
Start: 2019-01-01 | End: 2019-01-01

## 2019-01-01 RX ORDER — PHYTONADIONE 5 MG/1
5 TABLET ORAL ONCE
Status: COMPLETED | OUTPATIENT
Start: 2019-01-01 | End: 2019-01-01

## 2019-01-01 RX ORDER — POTASSIUM CHLORIDE 7.45 MG/ML
10 INJECTION INTRAVENOUS PRN
Status: DISCONTINUED | OUTPATIENT
Start: 2019-01-01 | End: 2019-01-01 | Stop reason: HOSPADM

## 2019-01-01 RX ORDER — ONDANSETRON 2 MG/ML
4 INJECTION INTRAMUSCULAR; INTRAVENOUS ONCE
Status: DISCONTINUED | OUTPATIENT
Start: 2019-01-01 | End: 2019-01-01 | Stop reason: HOSPADM

## 2019-01-01 RX ORDER — SODIUM CHLORIDE 450 MG/100ML
1000 INJECTION, SOLUTION INTRAVENOUS CONTINUOUS
Status: DISCONTINUED | OUTPATIENT
Start: 2019-01-01 | End: 2019-01-01

## 2019-01-01 RX ORDER — BUPIVACAINE HYDROCHLORIDE 5 MG/ML
INJECTION, SOLUTION EPIDURAL; INTRACAUDAL PRN
Status: DISCONTINUED | OUTPATIENT
Start: 2019-01-01 | End: 2019-01-01 | Stop reason: ALTCHOICE

## 2019-01-01 RX ORDER — PROPOFOL 10 MG/ML
INJECTION, EMULSION INTRAVENOUS CONTINUOUS PRN
Status: DISCONTINUED | OUTPATIENT
Start: 2019-01-01 | End: 2019-01-01 | Stop reason: SDUPTHER

## 2019-01-01 RX ORDER — FOLIC ACID/MULTIVIT,IRON,MINER .4-18-35
1 TABLET,CHEWABLE ORAL WEEKLY
Status: DISCONTINUED | OUTPATIENT
Start: 2019-01-01 | End: 2019-01-01 | Stop reason: CLARIF

## 2019-01-01 RX ORDER — HEPARIN SODIUM 10000 [USP'U]/100ML
18 INJECTION, SOLUTION INTRAVENOUS CONTINUOUS
Status: DISCONTINUED | OUTPATIENT
Start: 2019-01-01 | End: 2019-01-01

## 2019-01-01 RX ORDER — PROPOFOL 10 MG/ML
INJECTION, EMULSION INTRAVENOUS PRN
Status: DISCONTINUED | OUTPATIENT
Start: 2019-01-01 | End: 2019-01-01 | Stop reason: SDUPTHER

## 2019-01-01 RX ORDER — SODIUM CHLORIDE 0.9 % (FLUSH) 0.9 %
10 SYRINGE (ML) INJECTION EVERY 12 HOURS SCHEDULED
Status: DISCONTINUED | OUTPATIENT
Start: 2019-01-01 | End: 2019-01-01 | Stop reason: HOSPADM

## 2019-01-01 RX ORDER — MIDAZOLAM HYDROCHLORIDE 1 MG/ML
INJECTION INTRAMUSCULAR; INTRAVENOUS PRN
Status: DISCONTINUED | OUTPATIENT
Start: 2019-01-01 | End: 2019-01-01 | Stop reason: SDUPTHER

## 2019-01-01 RX ORDER — FUROSEMIDE 40 MG/1
40 TABLET ORAL EVERY MORNING
Status: DISCONTINUED | OUTPATIENT
Start: 2019-01-01 | End: 2019-01-01 | Stop reason: HOSPADM

## 2019-01-01 RX ORDER — BUMETANIDE 0.25 MG/ML
1 INJECTION, SOLUTION INTRAMUSCULAR; INTRAVENOUS 2 TIMES DAILY
Status: DISCONTINUED | OUTPATIENT
Start: 2019-01-01 | End: 2019-01-01 | Stop reason: HOSPADM

## 2019-01-01 RX ORDER — POTASSIUM CHLORIDE 7.45 MG/ML
10 INJECTION INTRAVENOUS
Status: COMPLETED | OUTPATIENT
Start: 2019-01-01 | End: 2019-01-01

## 2019-01-01 RX ORDER — IBUPROFEN 400 MG/1
400 TABLET ORAL 3 TIMES DAILY
Status: DISPENSED | OUTPATIENT
Start: 2019-01-01 | End: 2019-01-01

## 2019-01-01 RX ORDER — M-VIT,TX,IRON,MINS/CALC/FOLIC 27MG-0.4MG
1 TABLET ORAL WEEKLY
Status: DISCONTINUED | OUTPATIENT
Start: 2019-01-01 | End: 2019-01-01 | Stop reason: HOSPADM

## 2019-01-01 RX ORDER — PHYTONADIONE 5 MG/1
2.5 TABLET ORAL ONCE
Status: COMPLETED | OUTPATIENT
Start: 2019-01-01 | End: 2019-01-01

## 2019-01-01 RX ORDER — PANTOPRAZOLE SODIUM 40 MG/10ML
40 INJECTION, POWDER, LYOPHILIZED, FOR SOLUTION INTRAVENOUS 2 TIMES DAILY
Status: DISCONTINUED | OUTPATIENT
Start: 2019-01-01 | End: 2019-01-01 | Stop reason: HOSPADM

## 2019-01-01 RX ORDER — HEPARIN SODIUM 1000 [USP'U]/ML
80 INJECTION, SOLUTION INTRAVENOUS; SUBCUTANEOUS PRN
Status: DISCONTINUED | OUTPATIENT
Start: 2019-01-01 | End: 2019-01-01

## 2019-01-01 RX ORDER — FENTANYL CITRATE 50 UG/ML
INJECTION, SOLUTION INTRAMUSCULAR; INTRAVENOUS PRN
Status: DISCONTINUED | OUTPATIENT
Start: 2019-01-01 | End: 2019-01-01 | Stop reason: SDUPTHER

## 2019-01-01 RX ORDER — DOCUSATE SODIUM 100 MG/1
100 CAPSULE, LIQUID FILLED ORAL DAILY
Status: DISCONTINUED | OUTPATIENT
Start: 2019-01-01 | End: 2019-01-01 | Stop reason: HOSPADM

## 2019-01-01 RX ORDER — HEPARIN SODIUM 1000 [USP'U]/ML
80 INJECTION, SOLUTION INTRAVENOUS; SUBCUTANEOUS ONCE
Status: COMPLETED | OUTPATIENT
Start: 2019-01-01 | End: 2019-01-01

## 2019-01-01 RX ORDER — TOLVAPTAN 15 MG/1
15 TABLET ORAL ONCE
Status: COMPLETED | OUTPATIENT
Start: 2019-01-01 | End: 2019-01-01

## 2019-01-01 RX ORDER — MIDODRINE HYDROCHLORIDE 5 MG/1
5 TABLET ORAL
Status: DISCONTINUED | OUTPATIENT
Start: 2019-01-01 | End: 2019-01-01 | Stop reason: HOSPADM

## 2019-01-01 RX ORDER — DOXYCYCLINE HYCLATE 100 MG/1
100 CAPSULE ORAL EVERY 12 HOURS SCHEDULED
Qty: 20 CAPSULE | Refills: 0 | Status: SHIPPED | OUTPATIENT
Start: 2019-01-01 | End: 2019-01-01

## 2019-01-01 RX ORDER — HEPARIN SODIUM 1000 [USP'U]/ML
40 INJECTION, SOLUTION INTRAVENOUS; SUBCUTANEOUS PRN
Status: DISCONTINUED | OUTPATIENT
Start: 2019-01-01 | End: 2019-01-01

## 2019-01-01 RX ORDER — FOLIC ACID/MULTIVIT,IRON,MINER .4-18-35
1 TABLET,CHEWABLE ORAL WEEKLY
Status: DISCONTINUED | OUTPATIENT
Start: 2019-01-01 | End: 2019-01-01 | Stop reason: HOSPADM

## 2019-01-01 RX ORDER — ASPIRIN 81 MG/1
81 TABLET, CHEWABLE ORAL
Status: DISCONTINUED | OUTPATIENT
Start: 2019-01-01 | End: 2019-01-01 | Stop reason: HOSPADM

## 2019-01-01 RX ORDER — CALCIUM CARBONATE 200(500)MG
750 TABLET,CHEWABLE ORAL 3 TIMES DAILY PRN
Status: DISCONTINUED | OUTPATIENT
Start: 2019-01-01 | End: 2019-01-01 | Stop reason: HOSPADM

## 2019-01-01 RX ADMIN — APIXABAN 10 MG: 5 TABLET, FILM COATED ORAL at 07:55

## 2019-01-01 RX ADMIN — FUROSEMIDE 40 MG: 40 TABLET ORAL at 09:46

## 2019-01-01 RX ADMIN — ATORVASTATIN CALCIUM 40 MG: 40 TABLET, FILM COATED ORAL at 21:07

## 2019-01-01 RX ADMIN — DOXYCYCLINE HYCLATE 100 MG: 100 CAPSULE ORAL at 21:30

## 2019-01-01 RX ADMIN — PANTOPRAZOLE SODIUM 40 MG: 40 INJECTION, POWDER, LYOPHILIZED, FOR SOLUTION INTRAVENOUS at 21:06

## 2019-01-01 RX ADMIN — PIPERACILLIN SODIUM AND TAZOBACTAM SODIUM 3.38 G: 3; .375 INJECTION, POWDER, LYOPHILIZED, FOR SOLUTION INTRAVENOUS at 18:14

## 2019-01-01 RX ADMIN — Medication 10 ML: at 00:46

## 2019-01-01 RX ADMIN — OXYCODONE HYDROCHLORIDE 10 MG: 5 TABLET ORAL at 19:59

## 2019-01-01 RX ADMIN — PIPERACILLIN SODIUM AND TAZOBACTAM SODIUM 3.38 G: 3; .375 INJECTION, POWDER, LYOPHILIZED, FOR SOLUTION INTRAVENOUS at 08:10

## 2019-01-01 RX ADMIN — DOXYCYCLINE HYCLATE 100 MG: 100 CAPSULE ORAL at 20:29

## 2019-01-01 RX ADMIN — ASPIRIN 81 MG 81 MG: 81 TABLET ORAL at 20:39

## 2019-01-01 RX ADMIN — DOXYCYCLINE HYCLATE 100 MG: 100 CAPSULE ORAL at 21:07

## 2019-01-01 RX ADMIN — OXYCODONE HYDROCHLORIDE 10 MG: 5 TABLET ORAL at 09:48

## 2019-01-01 RX ADMIN — PIPERACILLIN SODIUM AND TAZOBACTAM SODIUM 3.38 G: 3; .375 INJECTION, POWDER, LYOPHILIZED, FOR SOLUTION INTRAVENOUS at 01:00

## 2019-01-01 RX ADMIN — ONDANSETRON 4 MG: 2 INJECTION INTRAMUSCULAR; INTRAVENOUS at 08:57

## 2019-01-01 RX ADMIN — OXYCODONE HYDROCHLORIDE 10 MG: 5 TABLET ORAL at 05:20

## 2019-01-01 RX ADMIN — SODIUM CHLORIDE 1000 ML: 4.5 INJECTION, SOLUTION INTRAVENOUS at 20:40

## 2019-01-01 RX ADMIN — ALBUMIN (HUMAN) 25 G: 0.25 INJECTION, SOLUTION INTRAVENOUS at 01:26

## 2019-01-01 RX ADMIN — VANCOMYCIN HYDROCHLORIDE 1250 MG: 10 INJECTION, POWDER, LYOPHILIZED, FOR SOLUTION INTRAVENOUS at 11:20

## 2019-01-01 RX ADMIN — Medication 10 ML: at 21:07

## 2019-01-01 RX ADMIN — OXYCODONE HYDROCHLORIDE 10 MG: 5 TABLET ORAL at 15:02

## 2019-01-01 RX ADMIN — OXYCODONE HYDROCHLORIDE 10 MG: 5 TABLET ORAL at 18:47

## 2019-01-01 RX ADMIN — MIDODRINE HYDROCHLORIDE 5 MG: 5 TABLET ORAL at 11:56

## 2019-01-01 RX ADMIN — ALBUMIN (HUMAN) 25 G: 0.25 INJECTION, SOLUTION INTRAVENOUS at 12:04

## 2019-01-01 RX ADMIN — PHYTONADIONE 2.5 MG: 5 TABLET ORAL at 05:25

## 2019-01-01 RX ADMIN — FUROSEMIDE 40 MG: 40 TABLET ORAL at 10:30

## 2019-01-01 RX ADMIN — SODIUM CHLORIDE 2000 ML: 9 INJECTION, SOLUTION INTRAVENOUS at 18:39

## 2019-01-01 RX ADMIN — ATORVASTATIN CALCIUM 40 MG: 40 TABLET, FILM COATED ORAL at 21:23

## 2019-01-01 RX ADMIN — IOPAMIDOL 75 ML: 755 INJECTION, SOLUTION INTRAVENOUS at 17:41

## 2019-01-01 RX ADMIN — MIDAZOLAM HYDROCHLORIDE 2 MG: 1 INJECTION, SOLUTION INTRAMUSCULAR; INTRAVENOUS at 17:09

## 2019-01-01 RX ADMIN — SODIUM CHLORIDE: 9 INJECTION, SOLUTION INTRAVENOUS at 01:26

## 2019-01-01 RX ADMIN — GABAPENTIN 300 MG: 300 CAPSULE ORAL at 20:29

## 2019-01-01 RX ADMIN — OXYCODONE HYDROCHLORIDE 10 MG: 5 TABLET ORAL at 21:07

## 2019-01-01 RX ADMIN — VANCOMYCIN HYDROCHLORIDE 1250 MG: 10 INJECTION, POWDER, LYOPHILIZED, FOR SOLUTION INTRAVENOUS at 23:03

## 2019-01-01 RX ADMIN — ATORVASTATIN CALCIUM 40 MG: 40 TABLET, FILM COATED ORAL at 01:26

## 2019-01-01 RX ADMIN — VANCOMYCIN HYDROCHLORIDE 1250 MG: 10 INJECTION, POWDER, LYOPHILIZED, FOR SOLUTION INTRAVENOUS at 06:32

## 2019-01-01 RX ADMIN — PIPERACILLIN SODIUM AND TAZOBACTAM SODIUM 3.38 G: 3; .375 INJECTION, POWDER, LYOPHILIZED, FOR SOLUTION INTRAVENOUS at 16:35

## 2019-01-01 RX ADMIN — POTASSIUM CHLORIDE 40 MEQ: 20 TABLET, EXTENDED RELEASE ORAL at 14:23

## 2019-01-01 RX ADMIN — OXYCODONE HYDROCHLORIDE 10 MG: 5 TABLET ORAL at 10:57

## 2019-01-01 RX ADMIN — OXYCODONE HYDROCHLORIDE 10 MG: 5 TABLET ORAL at 16:22

## 2019-01-01 RX ADMIN — Medication 10 ML: at 09:15

## 2019-01-01 RX ADMIN — ALBUMIN (HUMAN) 25 G: 0.25 INJECTION, SOLUTION INTRAVENOUS at 23:34

## 2019-01-01 RX ADMIN — HYDROMORPHONE HYDROCHLORIDE 0.5 MG: 1 INJECTION, SOLUTION INTRAMUSCULAR; INTRAVENOUS; SUBCUTANEOUS at 18:08

## 2019-01-01 RX ADMIN — BUMETANIDE 1 MG: 0.25 INJECTION INTRAMUSCULAR; INTRAVENOUS at 21:07

## 2019-01-01 RX ADMIN — SODIUM CHLORIDE 1000 ML: 9 INJECTION, SOLUTION INTRAVENOUS at 18:01

## 2019-01-01 RX ADMIN — ATORVASTATIN CALCIUM 40 MG: 40 TABLET, FILM COATED ORAL at 21:59

## 2019-01-01 RX ADMIN — ATORVASTATIN CALCIUM 40 MG: 40 TABLET, FILM COATED ORAL at 20:39

## 2019-01-01 RX ADMIN — DOXYCYCLINE HYCLATE 100 MG: 100 CAPSULE ORAL at 21:43

## 2019-01-01 RX ADMIN — OXYCODONE HYDROCHLORIDE 10 MG: 5 TABLET ORAL at 08:36

## 2019-01-01 RX ADMIN — POTASSIUM CHLORIDE 40 MEQ: 20 TABLET, EXTENDED RELEASE ORAL at 20:39

## 2019-01-01 RX ADMIN — PIPERACILLIN SODIUM AND TAZOBACTAM SODIUM 3.38 G: 3; .375 INJECTION, POWDER, LYOPHILIZED, FOR SOLUTION INTRAVENOUS at 16:08

## 2019-01-01 RX ADMIN — OXYCODONE HYDROCHLORIDE 10 MG: 5 TABLET ORAL at 23:11

## 2019-01-01 RX ADMIN — ALBUMIN (HUMAN) 25 G: 0.25 INJECTION, SOLUTION INTRAVENOUS at 04:08

## 2019-01-01 RX ADMIN — METOPROLOL TARTRATE 12.5 MG: 25 TABLET ORAL at 09:47

## 2019-01-01 RX ADMIN — OXYCODONE HYDROCHLORIDE 10 MG: 5 TABLET ORAL at 05:56

## 2019-01-01 RX ADMIN — OXYCODONE HYDROCHLORIDE 10 MG: 5 TABLET ORAL at 20:56

## 2019-01-01 RX ADMIN — ALBUMIN (HUMAN) 25 G: 0.25 INJECTION, SOLUTION INTRAVENOUS at 08:07

## 2019-01-01 RX ADMIN — APIXABAN 10 MG: 5 TABLET, FILM COATED ORAL at 10:11

## 2019-01-01 RX ADMIN — APIXABAN 10 MG: 5 TABLET, FILM COATED ORAL at 20:09

## 2019-01-01 RX ADMIN — ATORVASTATIN CALCIUM 40 MG: 40 TABLET, FILM COATED ORAL at 20:56

## 2019-01-01 RX ADMIN — OXYCODONE HYDROCHLORIDE 10 MG: 5 TABLET ORAL at 04:33

## 2019-01-01 RX ADMIN — ALBUMIN (HUMAN) 25 G: 0.25 INJECTION, SOLUTION INTRAVENOUS at 21:43

## 2019-01-01 RX ADMIN — ATORVASTATIN CALCIUM 40 MG: 40 TABLET, FILM COATED ORAL at 21:17

## 2019-01-01 RX ADMIN — DOXYCYCLINE HYCLATE 100 MG: 100 CAPSULE ORAL at 09:06

## 2019-01-01 RX ADMIN — SODIUM CHLORIDE 500 ML: 9 INJECTION, SOLUTION INTRAVENOUS at 20:29

## 2019-01-01 RX ADMIN — Medication: at 17:56

## 2019-01-01 RX ADMIN — OXYCODONE HYDROCHLORIDE 10 MG: 5 TABLET ORAL at 15:01

## 2019-01-01 RX ADMIN — BUMETANIDE 1 MG: 0.25 INJECTION INTRAMUSCULAR; INTRAVENOUS at 21:42

## 2019-01-01 RX ADMIN — BUMETANIDE 1 MG: 0.25 INJECTION INTRAMUSCULAR; INTRAVENOUS at 08:07

## 2019-01-01 RX ADMIN — METOPROLOL TARTRATE 12.5 MG: 25 TABLET ORAL at 07:51

## 2019-01-01 RX ADMIN — POTASSIUM CHLORIDE 10 MEQ: 10 INJECTION, SOLUTION INTRAVENOUS at 16:27

## 2019-01-01 RX ADMIN — METOPROLOL TARTRATE 12.5 MG: 25 TABLET ORAL at 20:10

## 2019-01-01 RX ADMIN — Medication 10 ML: at 08:58

## 2019-01-01 RX ADMIN — PIPERACILLIN SODIUM AND TAZOBACTAM SODIUM 3.38 G: 3; .375 INJECTION, POWDER, LYOPHILIZED, FOR SOLUTION INTRAVENOUS at 00:55

## 2019-01-01 RX ADMIN — PIPERACILLIN SODIUM AND TAZOBACTAM SODIUM 3.38 G: 3; .375 INJECTION, POWDER, LYOPHILIZED, FOR SOLUTION INTRAVENOUS at 00:39

## 2019-01-01 RX ADMIN — OXYCODONE HYDROCHLORIDE 10 MG: 5 TABLET ORAL at 14:04

## 2019-01-01 RX ADMIN — SODIUM CHLORIDE 250 ML: 9 INJECTION, SOLUTION INTRAVENOUS at 14:41

## 2019-01-01 RX ADMIN — METOPROLOL TARTRATE 12.5 MG: 25 TABLET ORAL at 10:16

## 2019-01-01 RX ADMIN — FUROSEMIDE 40 MG: 40 TABLET ORAL at 07:50

## 2019-01-01 RX ADMIN — HEPARIN SODIUM 3080 UNITS: 1000 INJECTION INTRAVENOUS; SUBCUTANEOUS at 22:10

## 2019-01-01 RX ADMIN — ALBUMIN (HUMAN) 25 G: 0.25 INJECTION, SOLUTION INTRAVENOUS at 07:02

## 2019-01-01 RX ADMIN — PIPERACILLIN SODIUM AND TAZOBACTAM SODIUM 3.38 G: 3; .375 INJECTION, POWDER, LYOPHILIZED, FOR SOLUTION INTRAVENOUS at 00:26

## 2019-01-01 RX ADMIN — GABAPENTIN 300 MG: 300 CAPSULE ORAL at 21:30

## 2019-01-01 RX ADMIN — SODIUM CHLORIDE: 4.5 INJECTION, SOLUTION INTRAVENOUS at 20:20

## 2019-01-01 RX ADMIN — Medication 10 ML: at 08:36

## 2019-01-01 RX ADMIN — FAMOTIDINE 20 MG: 10 INJECTION, SOLUTION INTRAVENOUS at 09:57

## 2019-01-01 RX ADMIN — MIDODRINE HYDROCHLORIDE 5 MG: 5 TABLET ORAL at 10:16

## 2019-01-01 RX ADMIN — OXYCODONE HYDROCHLORIDE 10 MG: 5 TABLET ORAL at 03:38

## 2019-01-01 RX ADMIN — APIXABAN 10 MG: 5 TABLET, FILM COATED ORAL at 09:45

## 2019-01-01 RX ADMIN — AMPICILLIN SODIUM AND SULBACTAM SODIUM 3 G: 2; 1 INJECTION, POWDER, FOR SOLUTION INTRAMUSCULAR; INTRAVENOUS at 16:53

## 2019-01-01 RX ADMIN — VANCOMYCIN HYDROCHLORIDE 1000 MG: 1 INJECTION, POWDER, LYOPHILIZED, FOR SOLUTION INTRAVENOUS at 21:38

## 2019-01-01 RX ADMIN — Medication 10 ML: at 18:43

## 2019-01-01 RX ADMIN — APIXABAN 10 MG: 5 TABLET, FILM COATED ORAL at 21:31

## 2019-01-01 RX ADMIN — GABAPENTIN 300 MG: 300 CAPSULE ORAL at 20:56

## 2019-01-01 RX ADMIN — POTASSIUM CHLORIDE 10 MEQ: 10 INJECTION, SOLUTION INTRAVENOUS at 15:01

## 2019-01-01 RX ADMIN — Medication 10 ML: at 21:31

## 2019-01-01 RX ADMIN — FUROSEMIDE 40 MG: 40 TABLET ORAL at 09:06

## 2019-01-01 RX ADMIN — POTASSIUM CHLORIDE 10 MEQ: 10 INJECTION, SOLUTION INTRAVENOUS at 13:10

## 2019-01-01 RX ADMIN — HEPARIN SODIUM 6150 UNITS: 1000 INJECTION INTRAVENOUS; SUBCUTANEOUS at 16:24

## 2019-01-01 RX ADMIN — SODIUM CHLORIDE 200 ML: 9 INJECTION, SOLUTION INTRAVENOUS at 23:00

## 2019-01-01 RX ADMIN — SODIUM CHLORIDE 1000 ML: 9 INJECTION, SOLUTION INTRAVENOUS at 17:43

## 2019-01-01 RX ADMIN — LORAZEPAM 0.5 MG: 2 INJECTION INTRAMUSCULAR; INTRAVENOUS at 00:46

## 2019-01-01 RX ADMIN — ATORVASTATIN CALCIUM 40 MG: 40 TABLET, FILM COATED ORAL at 21:43

## 2019-01-01 RX ADMIN — POTASSIUM CHLORIDE 40 MEQ: 20 TABLET, EXTENDED RELEASE ORAL at 16:59

## 2019-01-01 RX ADMIN — PIPERACILLIN SODIUM AND TAZOBACTAM SODIUM 3.38 G: 3; .375 INJECTION, POWDER, LYOPHILIZED, FOR SOLUTION INTRAVENOUS at 08:08

## 2019-01-01 RX ADMIN — VANCOMYCIN HYDROCHLORIDE 1250 MG: 10 INJECTION, POWDER, LYOPHILIZED, FOR SOLUTION INTRAVENOUS at 17:43

## 2019-01-01 RX ADMIN — ALBUMIN (HUMAN) 25 G: 0.25 INJECTION, SOLUTION INTRAVENOUS at 17:42

## 2019-01-01 RX ADMIN — DOXYCYCLINE HYCLATE 100 MG: 100 CAPSULE ORAL at 09:46

## 2019-01-01 RX ADMIN — GABAPENTIN 300 MG: 300 CAPSULE ORAL at 20:39

## 2019-01-01 RX ADMIN — SODIUM CHLORIDE 1000 ML: 4.5 INJECTION, SOLUTION INTRAVENOUS at 14:58

## 2019-01-01 RX ADMIN — Medication 10 ML: at 21:18

## 2019-01-01 RX ADMIN — APIXABAN 10 MG: 5 TABLET, FILM COATED ORAL at 20:29

## 2019-01-01 RX ADMIN — OXYCODONE HYDROCHLORIDE 10 MG: 5 TABLET ORAL at 15:11

## 2019-01-01 RX ADMIN — POTASSIUM CHLORIDE 40 MEQ: 20 TABLET, EXTENDED RELEASE ORAL at 12:37

## 2019-01-01 RX ADMIN — GABAPENTIN 300 MG: 300 CAPSULE ORAL at 20:09

## 2019-01-01 RX ADMIN — MIDODRINE HYDROCHLORIDE 5 MG: 5 TABLET ORAL at 12:58

## 2019-01-01 RX ADMIN — FAMOTIDINE 20 MG: 10 INJECTION, SOLUTION INTRAVENOUS at 10:17

## 2019-01-01 RX ADMIN — SODIUM CHLORIDE 1000 ML: 4.5 INJECTION, SOLUTION INTRAVENOUS at 17:00

## 2019-01-01 RX ADMIN — OXYCODONE HYDROCHLORIDE 10 MG: 5 TABLET ORAL at 09:25

## 2019-01-01 RX ADMIN — PIPERACILLIN SODIUM AND TAZOBACTAM SODIUM 3.38 G: 3; .375 INJECTION, POWDER, LYOPHILIZED, FOR SOLUTION INTRAVENOUS at 03:25

## 2019-01-01 RX ADMIN — OXYCODONE HYDROCHLORIDE 10 MG: 5 TABLET ORAL at 13:54

## 2019-01-01 RX ADMIN — Medication 10 ML: at 16:25

## 2019-01-01 RX ADMIN — HEPARIN SODIUM AND DEXTROSE 18 UNITS/KG/HR: 10000; 5 INJECTION INTRAVENOUS at 16:29

## 2019-01-01 RX ADMIN — GABAPENTIN 300 MG: 300 CAPSULE ORAL at 21:59

## 2019-01-01 RX ADMIN — VANCOMYCIN HYDROCHLORIDE 1250 MG: 10 INJECTION, POWDER, LYOPHILIZED, FOR SOLUTION INTRAVENOUS at 10:19

## 2019-01-01 RX ADMIN — ATORVASTATIN CALCIUM 40 MG: 40 TABLET, FILM COATED ORAL at 20:09

## 2019-01-01 RX ADMIN — OXYCODONE HYDROCHLORIDE 10 MG: 5 TABLET ORAL at 10:58

## 2019-01-01 RX ADMIN — PROPOFOL 50 MCG/KG/MIN: 10 INJECTION, EMULSION INTRAVENOUS at 17:12

## 2019-01-01 RX ADMIN — PIPERACILLIN SODIUM AND TAZOBACTAM SODIUM 3.38 G: 3; .375 INJECTION, POWDER, LYOPHILIZED, FOR SOLUTION INTRAVENOUS at 07:50

## 2019-01-01 RX ADMIN — PANTOPRAZOLE SODIUM 40 MG: 40 INJECTION, POWDER, LYOPHILIZED, FOR SOLUTION INTRAVENOUS at 08:07

## 2019-01-01 RX ADMIN — MIDODRINE HYDROCHLORIDE 5 MG: 5 TABLET ORAL at 18:00

## 2019-01-01 RX ADMIN — Medication 10 ML: at 10:16

## 2019-01-01 RX ADMIN — OXYCODONE HYDROCHLORIDE 10 MG: 5 TABLET ORAL at 07:50

## 2019-01-01 RX ADMIN — METOPROLOL TARTRATE 12.5 MG: 25 TABLET ORAL at 21:44

## 2019-01-01 RX ADMIN — OXYCODONE HYDROCHLORIDE 10 MG: 5 TABLET ORAL at 20:39

## 2019-01-01 RX ADMIN — DOCUSATE SODIUM 100 MG: 100 CAPSULE, LIQUID FILLED ORAL at 09:06

## 2019-01-01 RX ADMIN — SODIUM CHLORIDE: 4.5 INJECTION, SOLUTION INTRAVENOUS at 17:42

## 2019-01-01 RX ADMIN — Medication 10 ML: at 08:08

## 2019-01-01 RX ADMIN — ONDANSETRON 4 MG: 2 INJECTION INTRAMUSCULAR; INTRAVENOUS at 10:04

## 2019-01-01 RX ADMIN — ASPIRIN 81 MG 81 MG: 81 TABLET ORAL at 08:36

## 2019-01-01 RX ADMIN — Medication 10 ML: at 10:10

## 2019-01-01 RX ADMIN — METOPROLOL TARTRATE 12.5 MG: 25 TABLET ORAL at 08:36

## 2019-01-01 RX ADMIN — POTASSIUM CHLORIDE 10 MEQ: 10 INJECTION, SOLUTION INTRAVENOUS at 14:00

## 2019-01-01 RX ADMIN — SODIUM CHLORIDE 1000 ML: 4.5 INJECTION, SOLUTION INTRAVENOUS at 17:42

## 2019-01-01 RX ADMIN — DOXYCYCLINE HYCLATE 100 MG: 100 CAPSULE ORAL at 21:17

## 2019-01-01 RX ADMIN — MIDODRINE HYDROCHLORIDE 5 MG: 5 TABLET ORAL at 16:13

## 2019-01-01 RX ADMIN — Medication 1 TABLET: at 20:45

## 2019-01-01 RX ADMIN — FUROSEMIDE 40 MG: 40 TABLET ORAL at 08:13

## 2019-01-01 RX ADMIN — POTASSIUM BICARBONATE 40 MEQ: 782 TABLET, EFFERVESCENT ORAL at 18:31

## 2019-01-01 RX ADMIN — Medication 10 ML: at 21:45

## 2019-01-01 RX ADMIN — LABETALOL 20 MG/4 ML (5 MG/ML) INTRAVENOUS SYRINGE 5 MG: at 19:17

## 2019-01-01 RX ADMIN — FENTANYL CITRATE 100 MCG: 50 INJECTION, SOLUTION INTRAMUSCULAR; INTRAVENOUS at 17:12

## 2019-01-01 RX ADMIN — OXYCODONE HYDROCHLORIDE 10 MG: 5 TABLET ORAL at 00:43

## 2019-01-01 RX ADMIN — ALBUMIN (HUMAN) 25 G: 0.25 INJECTION, SOLUTION INTRAVENOUS at 17:38

## 2019-01-01 RX ADMIN — DOCUSATE SODIUM 100 MG: 100 CAPSULE, LIQUID FILLED ORAL at 12:37

## 2019-01-01 RX ADMIN — DOXYCYCLINE HYCLATE 100 MG: 100 CAPSULE ORAL at 09:57

## 2019-01-01 RX ADMIN — PANTOPRAZOLE SODIUM 40 MG: 40 INJECTION, POWDER, LYOPHILIZED, FOR SOLUTION INTRAVENOUS at 12:57

## 2019-01-01 RX ADMIN — TOLVAPTAN 15 MG: 15 TABLET ORAL at 16:13

## 2019-01-01 RX ADMIN — SODIUM CHLORIDE 2000 ML: 9 INJECTION, SOLUTION INTRAVENOUS at 16:11

## 2019-01-01 RX ADMIN — PHYTONADIONE 5 MG: 5 TABLET ORAL at 12:04

## 2019-01-01 RX ADMIN — METOPROLOL TARTRATE 12.5 MG: 25 TABLET ORAL at 01:26

## 2019-01-01 RX ADMIN — PROPOFOL 50 MG: 10 INJECTION, EMULSION INTRAVENOUS at 17:12

## 2019-01-01 RX ADMIN — SODIUM CHLORIDE: 9 INJECTION, SOLUTION INTRAVENOUS at 16:14

## 2019-01-01 RX ADMIN — OXYCODONE HYDROCHLORIDE 10 MG: 5 TABLET ORAL at 18:23

## 2019-01-01 RX ADMIN — PIPERACILLIN SODIUM AND TAZOBACTAM SODIUM 3.38 G: 3; .375 INJECTION, POWDER, LYOPHILIZED, FOR SOLUTION INTRAVENOUS at 08:35

## 2019-01-01 RX ADMIN — SODIUM CHLORIDE 100 ML: 9 INJECTION, SOLUTION INTRAVENOUS at 14:59

## 2019-01-01 RX ADMIN — METOPROLOL TARTRATE 12.5 MG: 25 TABLET ORAL at 08:35

## 2019-01-01 RX ADMIN — MIDODRINE HYDROCHLORIDE 5 MG: 5 TABLET ORAL at 17:32

## 2019-01-01 RX ADMIN — PANTOPRAZOLE SODIUM 40 MG: 40 INJECTION, POWDER, LYOPHILIZED, FOR SOLUTION INTRAVENOUS at 21:42

## 2019-01-01 RX ADMIN — VANCOMYCIN HYDROCHLORIDE 1250 MG: 10 INJECTION, POWDER, LYOPHILIZED, FOR SOLUTION INTRAVENOUS at 06:01

## 2019-01-01 RX ADMIN — OXYCODONE HYDROCHLORIDE 10 MG: 5 TABLET ORAL at 20:09

## 2019-01-01 RX ADMIN — DOXYCYCLINE HYCLATE 100 MG: 100 CAPSULE ORAL at 10:16

## 2019-01-01 RX ADMIN — APIXABAN 10 MG: 5 TABLET, FILM COATED ORAL at 09:06

## 2019-01-01 RX ADMIN — SODIUM CHLORIDE 500 ML: 9 INJECTION, SOLUTION INTRAVENOUS at 22:48

## 2019-01-01 RX ADMIN — MIDODRINE HYDROCHLORIDE 5 MG: 5 TABLET ORAL at 08:07

## 2019-01-01 RX ADMIN — VANCOMYCIN HYDROCHLORIDE 1250 MG: 10 INJECTION, POWDER, LYOPHILIZED, FOR SOLUTION INTRAVENOUS at 18:43

## 2019-01-01 RX ADMIN — SODIUM CHLORIDE 2367 ML: 9 INJECTION, SOLUTION INTRAVENOUS at 20:38

## 2019-01-01 RX ADMIN — METOPROLOL TARTRATE 12.5 MG: 25 TABLET ORAL at 21:31

## 2019-01-01 RX ADMIN — DOXYCYCLINE HYCLATE 100 MG: 100 CAPSULE ORAL at 08:07

## 2019-01-01 RX ADMIN — ATORVASTATIN CALCIUM 40 MG: 40 TABLET, FILM COATED ORAL at 20:29

## 2019-01-01 RX ADMIN — IOPAMIDOL 80 ML: 755 INJECTION, SOLUTION INTRAVENOUS at 14:15

## 2019-01-01 RX ADMIN — ALBUMIN (HUMAN) 25 G: 0.25 INJECTION, SOLUTION INTRAVENOUS at 05:25

## 2019-01-01 RX ADMIN — SODIUM CHLORIDE 1000 ML: 4.5 INJECTION, SOLUTION INTRAVENOUS at 13:12

## 2019-01-01 RX ADMIN — METOPROLOL TARTRATE 12.5 MG: 25 TABLET ORAL at 20:56

## 2019-01-01 RX ADMIN — VANCOMYCIN HYDROCHLORIDE 1250 MG: 10 INJECTION, POWDER, LYOPHILIZED, FOR SOLUTION INTRAVENOUS at 22:00

## 2019-01-01 RX ADMIN — OXYCODONE HYDROCHLORIDE 10 MG: 5 TABLET ORAL at 03:15

## 2019-01-01 ASSESSMENT — PAIN DESCRIPTION - ONSET
ONSET: ON-GOING
ONSET: GRADUAL
ONSET: ON-GOING
ONSET: AWAKENED FROM SLEEP
ONSET: ON-GOING
ONSET: GRADUAL
ONSET: ON-GOING
ONSET: GRADUAL
ONSET: ON-GOING

## 2019-01-01 ASSESSMENT — PAIN SCALES - GENERAL
PAINLEVEL_OUTOF10: 0
PAINLEVEL_OUTOF10: 8
PAINLEVEL_OUTOF10: 5
PAINLEVEL_OUTOF10: 0
PAINLEVEL_OUTOF10: 6
PAINLEVEL_OUTOF10: 0
PAINLEVEL_OUTOF10: 2
PAINLEVEL_OUTOF10: 2
PAINLEVEL_OUTOF10: 0
PAINLEVEL_OUTOF10: 0
PAINLEVEL_OUTOF10: 5
PAINLEVEL_OUTOF10: 7
PAINLEVEL_OUTOF10: 3
PAINLEVEL_OUTOF10: 0
PAINLEVEL_OUTOF10: 0
PAINLEVEL_OUTOF10: 5
PAINLEVEL_OUTOF10: 4
PAINLEVEL_OUTOF10: 5
PAINLEVEL_OUTOF10: 5
PAINLEVEL_OUTOF10: 0
PAINLEVEL_OUTOF10: 6
PAINLEVEL_OUTOF10: 5
PAINLEVEL_OUTOF10: 0
PAINLEVEL_OUTOF10: 6
PAINLEVEL_OUTOF10: 6
PAINLEVEL_OUTOF10: 5
PAINLEVEL_OUTOF10: 6
PAINLEVEL_OUTOF10: 8
PAINLEVEL_OUTOF10: 9
PAINLEVEL_OUTOF10: 9
PAINLEVEL_OUTOF10: 0
PAINLEVEL_OUTOF10: 0
PAINLEVEL_OUTOF10: 6
PAINLEVEL_OUTOF10: 4
PAINLEVEL_OUTOF10: 0
PAINLEVEL_OUTOF10: 6
PAINLEVEL_OUTOF10: 2
PAINLEVEL_OUTOF10: 4
PAINLEVEL_OUTOF10: 0
PAINLEVEL_OUTOF10: 2
PAINLEVEL_OUTOF10: 6
PAINLEVEL_OUTOF10: 7
PAINLEVEL_OUTOF10: 6
PAINLEVEL_OUTOF10: 6
PAINLEVEL_OUTOF10: 9
PAINLEVEL_OUTOF10: 6
PAINLEVEL_OUTOF10: 2
PAINLEVEL_OUTOF10: 0
PAINLEVEL_OUTOF10: 5
PAINLEVEL_OUTOF10: 0
PAINLEVEL_OUTOF10: 2
PAINLEVEL_OUTOF10: 5
PAINLEVEL_OUTOF10: 0
PAINLEVEL_OUTOF10: 0
PAINLEVEL_OUTOF10: 6
PAINLEVEL_OUTOF10: 2
PAINLEVEL_OUTOF10: 5
PAINLEVEL_OUTOF10: 0
PAINLEVEL_OUTOF10: 5
PAINLEVEL_OUTOF10: 2
PAINLEVEL_OUTOF10: 0
PAINLEVEL_OUTOF10: 5
PAINLEVEL_OUTOF10: 0
PAINLEVEL_OUTOF10: 0
PAINLEVEL_OUTOF10: 5
PAINLEVEL_OUTOF10: 3
PAINLEVEL_OUTOF10: 0
PAINLEVEL_OUTOF10: 8
PAINLEVEL_OUTOF10: 5
PAINLEVEL_OUTOF10: 6

## 2019-01-01 ASSESSMENT — PAIN DESCRIPTION - ORIENTATION
ORIENTATION: RIGHT

## 2019-01-01 ASSESSMENT — PULMONARY FUNCTION TESTS
PIF_VALUE: 1
PIF_VALUE: 0
PIF_VALUE: 1
PIF_VALUE: 0
PIF_VALUE: 1
PIF_VALUE: 1
PIF_VALUE: 0
PIF_VALUE: 0
PIF_VALUE: 1
PIF_VALUE: 0
PIF_VALUE: 1

## 2019-01-01 ASSESSMENT — PAIN DESCRIPTION - DESCRIPTORS
DESCRIPTORS: CONSTANT;DULL;ACHING
DESCRIPTORS: BURNING;DULL;ACHING
DESCRIPTORS: SORE;BURNING;SHARP
DESCRIPTORS: DISCOMFORT;SHARP
DESCRIPTORS: DISCOMFORT;SHARP
DESCRIPTORS: ACHING;DISCOMFORT
DESCRIPTORS: DISCOMFORT
DESCRIPTORS: ACHING;DISCOMFORT
DESCRIPTORS: CONSTANT;DULL;ACHING
DESCRIPTORS: SHARP;DULL
DESCRIPTORS: BURNING;OTHER (COMMENT)
DESCRIPTORS: ACHING;SORE
DESCRIPTORS: CONSTANT;DULL;ACHING
DESCRIPTORS: BURNING;DULL;ACHING
DESCRIPTORS: CONSTANT;DULL;ACHING
DESCRIPTORS: DISCOMFORT
DESCRIPTORS: DULL;ACHING
DESCRIPTORS: ACHING;DISCOMFORT;CRUSHING
DESCRIPTORS: ACHING;DISCOMFORT
DESCRIPTORS: ACHING;SORE
DESCRIPTORS: CONSTANT;DULL;SHARP
DESCRIPTORS: ACHING;SORE
DESCRIPTORS: ACHING;DISCOMFORT
DESCRIPTORS: DISCOMFORT
DESCRIPTORS: DISCOMFORT
DESCRIPTORS: ACHING;DISCOMFORT;SHARP
DESCRIPTORS: DISCOMFORT
DESCRIPTORS: CONSTANT;DULL;SHARP
DESCRIPTORS: ACHING;DISCOMFORT

## 2019-01-01 ASSESSMENT — PAIN DESCRIPTION - PAIN TYPE
TYPE: ACUTE PAIN
TYPE: SURGICAL PAIN
TYPE: ACUTE PAIN
TYPE: SURGICAL PAIN
TYPE: ACUTE PAIN
TYPE: SURGICAL PAIN
TYPE: SURGICAL PAIN
TYPE: SURGICAL PAIN;ACUTE PAIN
TYPE: SURGICAL PAIN;ACUTE PAIN
TYPE: SURGICAL PAIN
TYPE: SURGICAL PAIN
TYPE: CHRONIC PAIN
TYPE: SURGICAL PAIN
TYPE: ACUTE PAIN
TYPE: ACUTE PAIN
TYPE: ACUTE PAIN;SURGICAL PAIN
TYPE: SURGICAL PAIN
TYPE: ACUTE PAIN
TYPE: SURGICAL PAIN
TYPE: ACUTE PAIN
TYPE: SURGICAL PAIN
TYPE: SURGICAL PAIN
TYPE: ACUTE PAIN
TYPE: ACUTE PAIN
TYPE: SURGICAL PAIN
TYPE: ACUTE PAIN;SURGICAL PAIN

## 2019-01-01 ASSESSMENT — PAIN - FUNCTIONAL ASSESSMENT
PAIN_FUNCTIONAL_ASSESSMENT: PREVENTS OR INTERFERES SOME ACTIVE ACTIVITIES AND ADLS
PAIN_FUNCTIONAL_ASSESSMENT: ACTIVITIES ARE NOT PREVENTED
PAIN_FUNCTIONAL_ASSESSMENT: PREVENTS OR INTERFERES SOME ACTIVE ACTIVITIES AND ADLS
PAIN_FUNCTIONAL_ASSESSMENT: PREVENTS OR INTERFERES WITH MANY ACTIVE NOT PASSIVE ACTIVITIES
PAIN_FUNCTIONAL_ASSESSMENT: PREVENTS OR INTERFERES SOME ACTIVE ACTIVITIES AND ADLS
PAIN_FUNCTIONAL_ASSESSMENT: ACTIVITIES ARE NOT PREVENTED
PAIN_FUNCTIONAL_ASSESSMENT: PREVENTS OR INTERFERES SOME ACTIVE ACTIVITIES AND ADLS

## 2019-01-01 ASSESSMENT — PAIN DESCRIPTION - PROGRESSION
CLINICAL_PROGRESSION: NOT CHANGED
CLINICAL_PROGRESSION: GRADUALLY WORSENING
CLINICAL_PROGRESSION: NOT CHANGED
CLINICAL_PROGRESSION: NOT CHANGED
CLINICAL_PROGRESSION: GRADUALLY WORSENING
CLINICAL_PROGRESSION: NOT CHANGED
CLINICAL_PROGRESSION: GRADUALLY WORSENING
CLINICAL_PROGRESSION: NOT CHANGED

## 2019-01-01 ASSESSMENT — PAIN DESCRIPTION - LOCATION
LOCATION: FOOT
LOCATION: FOOT
LOCATION: FOOT;GENERALIZED
LOCATION: FOOT
LOCATION: FOOT
LOCATION: LEG
LOCATION: FOOT
LOCATION: FOOT;GENERALIZED
LOCATION: FOOT
LOCATION: GENERALIZED
LOCATION: LEG
LOCATION: FOOT
LOCATION: LEG
LOCATION: CHEST;FOOT
LOCATION: TOE (COMMENT WHICH ONE)
LOCATION: FOOT
LOCATION: CHEST;FOOT
LOCATION: FOOT

## 2019-01-01 ASSESSMENT — PAIN DESCRIPTION - FREQUENCY
FREQUENCY: CONTINUOUS
FREQUENCY: INTERMITTENT
FREQUENCY: CONTINUOUS
FREQUENCY: INTERMITTENT
FREQUENCY: INTERMITTENT
FREQUENCY: CONTINUOUS
FREQUENCY: INTERMITTENT
FREQUENCY: CONTINUOUS
FREQUENCY: INTERMITTENT

## 2019-01-01 ASSESSMENT — ENCOUNTER SYMPTOMS
COUGH: 0
SORE THROAT: 0
SINUS PRESSURE: 0
DIARRHEA: 0
EYE DISCHARGE: 0
EYE REDNESS: 0
EYE PAIN: 0
ABDOMINAL PAIN: 0
BACK PAIN: 0
SHORTNESS OF BREATH: 0
WHEEZING: 0
VISUAL CHANGE: 0
NAUSEA: 0
VOMITING: 0

## 2019-04-15 NOTE — ED NOTES
Assumed care of patient. Pt lying in bed in no apparent distress. Denies pain. No visitors at bedside.      Jose Danielle RN  04/15/19 1930

## 2019-04-15 NOTE — ED PROVIDER NOTES
Department of Emergency Medicine   ED  Provider Note  Admit Date/RoomTime: 4/15/2019  3:30 PM  ED Room: 03/03          History of Present Illness:  4/15/19, Time: 3:42 PM         Best Dominguez is a 59 y.o. male presenting to the ED for general weakness, beginning 2-3 days ago. The complaint has been persistent, severe in severity, and worsened by any exertion. He states he has been on chemo for lung cancer and states his last CT Chest showed complete or near-complete remission. His son states that his legs have been swollen over the last several days as well. Nothing has made his symptoms better at home. Review of Systems:   Pertinent positives and negatives are stated within HPI, all other systems reviewed and are negative.        --------------------------------------------- PAST HISTORY ---------------------------------------------  Past Medical History:  has a past medical history of CAD (coronary artery disease), COPD [facility]chronic obstructive [facility] disease) ([facility] Hyperlipidemia, Hypertension, [facility] and MI (mitral incompetence). Past Surgical History:  has a past surgical history that includes angioplasty (03/2017); Coronary angioplasty with stent (2009); thoracentesis (Left, 09/24/2017); and Lung biopsy. Social History:  reports that he quit smoking about 18 months ago. His smoking use included cigarettes. He started smoking about 47 years ago. He has a 90.00 pack-year smoking history. He has never used smokeless tobacco. He reports that he drinks about 3.6 oz of alcohol per week. He reports that he does not use drugs. Family History: family history includes Cancer in his mother. The patients home medications have been reviewed. Allergies: Patient has no known allergies. ---------------------------------------------------PHYSICAL EXAM--------------------------------------    Constitutional/General: Alert and oriented x3.  Cachectic in appearance with wasted Basophils % 0.3 0.0 - 2.0 %    Neutrophils # 2.48 1.80 - 7.30 E9/L    Immature Granulocytes # 0.10 E9/L    Lymphocytes # 0.73 (L) 1.50 - 4.00 E9/L    Monocytes # 0.29 0.10 - 0.95 E9/L    Eosinophils # 0.05 0.05 - 0.50 E9/L    Basophils # 0.01 0.00 - 0.20 E9/L   D-Dimer, Quantitative   Result Value Ref Range    D-Dimer, Quant 922 ng/mL DDU   Brain Natriuretic Peptide   Result Value Ref Range    Pro- (H) 0 - 125 pg/mL   Basic Metabolic Panel w/ Reflex to MG   Result Value Ref Range    Sodium 136 132 - 146 mmol/L    Chloride 100 98 - 107 mmol/L    CO2 28 22 - 29 mmol/L    Anion Gap 8 7 - 16 mmol/L    Glucose 139 (H) 74 - 99 mg/dL    BUN 9 8 - 23 mg/dL    CREATININE 0.8 0.7 - 1.2 mg/dL    GFR Non-African American >60 >=60 mL/min/1.73    GFR African American >60     Calcium 7.9 (L) 8.6 - 10.2 mg/dL    Potassium reflex Magnesium 3.2 (L) 3.5 - 5.0 mmol/L   Platelet Confirmation   Result Value Ref Range    Platelet Confirmation CONFIRMED    Magnesium   Result Value Ref Range    Magnesium 1.7 1.6 - 2.6 mg/dL   EKG 12 Lead   Result Value Ref Range    Ventricular Rate 145 BPM    Atrial Rate 145 BPM    P-R Interval 120 ms    QRS Duration 68 ms    Q-T Interval 286 ms    QTc Calculation (Bazett) 444 ms    P Axis 42 degrees    R Axis 56 degrees    T Axis 95 degrees       RADIOLOGY:  Interpreted by Radiologist unless otherwise specified  CT CHEST PULMONARY EMBOLISM W CONTRAST   Final Result   No central pulmonary embolism or aortic dissection. Persistent infiltrative mass in the left upper lobe extending to left   hilum which is marginally improved concerning for persistent   malignancy with  fibrosis and scarring with  pleural effusion and   atelectasis in the lower lobes. Developing bone metastasis. Liver cirrhosis with multiple indeterminate low-attenuation lesions   and moderate amount of ascites.             XR CHEST PORTABLE   Final Result   Right jugular infusion catheter which appears to be within the distal superior vena cava. Pleural density in the left upper and   lower lung zones, more extensive superiorly, possibly representing   pleural scarring, however, recurrent effusion cannot be excluded. No   definite pneumonic consolidation or vascular congestion. EKG Interpretation    Interpreted by emergency department physician    Rhythm: sinus tachycardia  Rate: tachycardia  Axis: normal  Ectopy: none  Conduction: normal  ST Segments: no acute change  T Waves: no acute change  Q Waves: none    Clinical Impression: non-specific EKG and sinus tachycardia    Shruti Henriquez        ------------------------- NURSING NOTES AND VITALS REVIEWED ---------------------------   The nursing notes within the ED encounter and vital signs as below have been reviewed by myself. /77   Pulse 110   Temp 98.1 °F (36.7 °C) (Oral)   Resp 16   Ht 6' (1.829 m)   Wt 144 lb (65.3 kg)   SpO2 97%   BMI 19.53 kg/m²   Oxygen Saturation Interpretation: Normal    The patients available past medical records and past encounters were reviewed. ------------------------------ ED COURSE/MEDICAL DECISION MAKING----------------------  Medications   sodium chloride flush 0.9 % injection 10 mL (has no administration in time range)   ondansetron (ZOFRAN) injection 4 mg (4 mg Intravenous Not Given 4/15/19 1929)   0.9 % sodium chloride bolus (0 mLs Intravenous Stopped 4/15/19 1728)   potassium bicarb-citric acid (EFFER-K) effervescent tablet 40 mEq (40 mEq Oral Given 4/15/19 1831)   iopamidol (ISOVUE-370) 76 % injection 75 mL (75 mLs Intravenous Given 4/15/19 1741)   0.9 % sodium chloride bolus (2,000 mLs Intravenous New Bag 4/15/19 1839)              Medical Decision Making:    Patient presents to the ED for palpitations and weakness. He was impressively tachycardic upon arrival to the ED but improved rapidly with IVF and feels much better. CTA showed improvement of the mass and no PE.  He was still tachycardic but stated he would like to go home because he feels better. He ambulated to the bathroom without assistance and with no further symptoms. Potassium was repleted in the department. Discussed the possibility of an outpatient hospice consult but patient does not appear ready for that option yet. Re-Evaluations:                  Counseling: The emergency provider has spoken with the patient and discussed todays results, in addition to providing specific details for the plan of care and counseling regarding the diagnosis and prognosis. Questions are answered at this time and they are agreeable with the plan.       --------------------------------- IMPRESSION AND DISPOSITION ---------------------------------    IMPRESSION  1. Dehydration    2. Nausea    3. Hypokalemia        DISPOSITION  Disposition: Discharge to home  Patient condition is fair        NOTE: This report was transcribed using voice recognition software.  Every effort was made to ensure accuracy; however, inadvertent computerized transcription errors may be present       Jayden Roberts DO  Resident  04/15/19 2017 no

## 2019-04-15 NOTE — ED NOTES
FIRST PROVIDER CONTACT ASSESSMENT NOTE      Department of Emergency Medicine   Admit Date: No admission date for patient encounter. Chief Complaint: No chief complaint on file. History of Present Illness:    Saima Ortiz is a 59 y.o. male who presents to the ED for check up at [facility], was sent here for c/o fatigue and palpitation.  Is on chemo for lung cancer        -----------------END OF FIRST PROVIDER CONTACT ASSESSMENT NOTE--------------  Electronically signed by BENITO Gomez CNP   DD: 4/15/19               BENITO Lovell CNP  04/15/19 1532

## 2019-04-16 NOTE — ED NOTES
Discharge instructions given and reviewed with patient and son. Instructed to follow up with Dr Benji Colvin. Questions and concerns addressed. Pt departed ED in w/c with staff and son in no apparent distress. Personal belongings taken.      Ridge Roach RN  04/15/19 [id number]

## 2019-04-29 PROBLEM — L08.9 INFECTED ABRASION OF RIGHT FOOT: Status: ACTIVE | Noted: 2019-01-01

## 2019-04-29 PROBLEM — A41.9 SEPSIS (HCC): Status: ACTIVE | Noted: 2019-01-01

## 2019-04-29 PROBLEM — S90.811A INFECTED ABRASION OF RIGHT FOOT: Status: ACTIVE | Noted: 2019-01-01

## 2019-04-29 NOTE — H&P
tablet Place 1 tablet under the tongue if needed every 5 minutes for chest pain 3/18/19  Yes Historical Provider, MD   oxyCODONE HCl (OXY-IR) 10 MG immediate release tablet Take 1 tablet by mouth every 4-6 hours as needed. 4/25/19  Yes Historical Provider, MD   Gemcitabine HCl (GEMZAR IV) Infuse intravenously every 28 days   Yes Historical Provider, MD   Zoledronic Acid (ZOMETA IV) Infuse intravenously   Yes Historical Provider, MD   atorvastatin (LIPITOR) 40 MG tablet Take 40 mg by mouth nightly    Yes Historical Provider, MD   Omega-3 Fatty Acids (FISH OIL) 1200 MG CAPS Take 1 capsule by mouth Twice a Week    Yes Historical Provider, MD   multivitamin-iron-minerals-folic acid (CENTRUM) chewable tablet Take 1 tablet by mouth once a week    Yes Historical Provider, MD       Allergies:    Patient has no known allergies. Social History:    reports that he quit smoking about 19 months ago. His smoking use included cigarettes. He started smoking about 47 years ago. He has a 90.00 pack-year smoking history. He has never used smokeless tobacco. He reports that he drinks about 3.6 oz of alcohol per week. He reports that he does not use drugs.     Family History:       Problem Relation Age of Onset   [place] [facility] Mother         colon       PHYSICAL EXAM:  Vitals:  /77   Pulse 132   Temp 98.3 °F (36.8 °C) (Oral)   Resp 14   Ht 6' (1.829 m)   Wt 150 lb (68 kg)   SpO2 97%   BMI 20.34 kg/m²   General Appearance: alert and oriented to person, place and time, well-developed and well-nourished, in no acute distress  Skin: warm and dry, no rash or erythema  Head: normocephalic and atraumatic  Eyes: pupils equal, round, and reactive to light, extraocular eye movements intact, conjunctivae normal  ENT: tympanic membrane, external ear and ear canal normal bilaterally, oropharynx clear and moist with normal mucous membranes  Neck: neck supple and non tender without mass, no thyromegaly or thyroid nodules, no cervical lymphadenopathy   Pulmonary/Chest: clear to auscultation bilaterally- no wheezes, rales or rhonchi, normal air movement, no respiratory distress and Local chest pain reproducible  Cardiovascular: normal rate, normal S1 and S2, no gallops, intact distal pulses and no carotid bruits  Abdomen: soft, non-tender, non-distended, normal bowel sounds, no masses or organomegaly      LABS:  Recent Labs     19  1649      K 3.4*   CL 94*   CO2 33*   BUN 10   CREATININE 0.9   GLUCOSE 111*   CALCIUM 7.6*       Recent Labs     19  1649   WBC 19.7*   RBC 3.61*   HGB 11.7*   HCT 38.1   .5*   MCH 32.4   MCHC 30.7*   RDW 18.7*      MPV 10.8     K 3.4  Ca 7.6  LA 4.9    No results for input(s): POCGLU in the last 72 hours. Radiology: Xr Foot Right (min 3 Views)    Result Date: 2019  Patient MRN:  [id number] : 1955 Age: 59 years Gender: Male Order Date:  2019 4:15 PM EXAM: XR FOOT RIGHT (MIN 3 VIEWS) NUMBER OF IMAGES:  3 views INDICATION: Foot pain, sepsis evaluation COMPARISON: None FINDINGS: Bony structures are intact with preservation of alignment and joint spacing. Hypertrophic changes of the first metatarsal head are consistent with a bunion, although there is no hallux valgus alignment. There is a slight dorsal hyperostosis. 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 No focal soft tissue abnormalities are identified. There is mild dorsal forefoot soft tissue prominence over the metatarsals, without underlying focal bony abnormality. Mild dorsal forefoot soft tissue swelling and degenerative changes of the first MTP joint without other significant findings. Xr Chest Portable    Result Date: 2019  Patient MRN: [id number] : 1955 Age:  59 years Gender: Male Order Date: 2019 4:00 PM Exam: XR CHEST PORTABLE Number of Views: 1 Indication:   Sepsis workup. Comparison: CT chest 4/15/2019. Chest x-ray 4/15/2019.  Findings: There is a stable cardiomediastinal silhouette with right-sided Mediport again noted. Underlying central pulmonary vascular congestion. Bibasilar airspace disease with airspace disease in the left upper lobe and a suspected infiltrative mass or malignancy noted in the left hilar region. Mariana Alyssa No pneumothorax. ALERT:  THIS IS AN ABNORMAL REPORT 1. Suspected infiltrative mass/malignancy left hilar and suprahilar region. 2. Multifocal airspace disease left upper lung and bilateral lung bases could be reflective of atelectasis/scarring and/or infiltrate/pneumonia with suspected small bilateral pleural effusions. 3. Underlying mild central pulmonary vascular congestion. EKG: Tachycardia / sinus    ASSESSMENT  and  PLAN:    1. Infected Right big toe / Isabel  / ID  2. Tip with Gangrene / consult podiatry / Wound care   3. Ca Lung  4. Pain / continue Oxycodone  5. New pain / Right chest  6. Hypokalemia / supplement  7. Hypocalcemia  8. Elevated Lactic acid    Code Status: Full  DVT prophylaxis: lovenox      Electronically signed by Rock Fiore MD on 4/29/2019 at 6:18 PM      NOTE: This report was transcribed using voice recognition software. Every effort was made to ensure accuracy; however, inadvertent computerized transcription errors may be present.

## 2019-04-29 NOTE — ED PROVIDER NOTES
30-year-old male history of lung cancer with known metastasis to bone presents to the emergency department with generalized weakness, difficulty ambulating, and total necrosis. The patient states this has been going on for the last week or so he went to be seen by his cancer doctor today Dr. Lizbet Escalante who sent him for evaluation and likely admission. Patient states he also had some increased lower extremity swelling bilaterally. He denies fevers or chills cough or shortness of breath chest pain nausea vomiting diarrhea or abdominal pain. The history is provided by the patient. Fatigue   Severity:  Moderate  Onset quality:  Gradual  Duration:  2 weeks  Timing:  Intermittent  Progression:  Waxing and waning  Chronicity:  New  Relieved by:  Nothing  Worsened by:  Nothing  Ineffective treatments:  None tried  Associated symptoms: difficulty walking and lethargy    Associated symptoms: no abdominal pain, no anorexia, no arthralgias, no ataxia, no chest pain, no cough, no diarrhea, no drooling, no dysphagia, no dysuria, no falls, no fever, no frequency, no headaches, no loss of consciousness, no melena, no myalgias, no nausea, no sensory-motor deficit, no shortness of breath, no stroke symptoms, no vision change and no vomiting    Risk factors comment:  Lung cancer with bone metasisi      Review of Systems   Constitutional: Positive for fatigue. Negative for chills and fever. HENT: Negative for drooling, ear pain, sinus pressure and sore throat. Eyes: Negative for pain, discharge and redness. Respiratory: Negative for cough, shortness of breath and wheezing. Cardiovascular: Positive for leg swelling. Negative for chest pain. Gastrointestinal: Negative for abdominal pain, anorexia, diarrhea, dysphagia, melena, nausea and vomiting. Genitourinary: Negative for dysuria and frequency. Musculoskeletal: Negative for arthralgias, back pain, falls and myalgias. Skin: Positive for wound. Negative for rash. Neurological: Negative for loss of consciousness, weakness and headaches. Hematological: Negative for adenopathy. All other systems reviewed and are negative. Physical Exam   Constitutional: He is oriented to person, place, and time. He appears cachectic. He is cooperative. He appears ill. Cardiovascular: Regular rhythm. Tachycardia present. Pulses:       Dorsalis pedis pulses are Detected w/ doppler on the right side, and Detected w/ doppler on the left side. Posterior tibial pulses are Detected w/ doppler on the right side, and Detected w/ doppler on the left side. Pulmonary/Chest: Effort normal and breath sounds normal.   Abdominal: Soft. Normal appearance and bowel sounds are normal. There is no tenderness. Musculoskeletal:        Right lower leg: He exhibits edema. Left lower leg: He exhibits edema. Legs:  Feet:   Left Foot:   Skin Integrity: Positive for ulcer. Neurological: He is alert and oriented to person, place, and time. Procedures    MDM    ED Course as of Apr 29 1758   Mon Apr 29, 2019   1609 Patient seen and examined patient is tachycardic and a cancer patient with bilateral lower extremity swelling and a necrotic right toe. Workup from a septic standpoint. With plan for admission antibiotics been initiated vancomycin and Unasyn. Patient has dopplerable pulses in bilateral feet     [CF]   1756 Imaging indicates patient has soft tissue swelling of the right foot at the site of the necrotic toe patient is known to have mass in chest does not appear there is a pneumonia he is complaining of no shortness of breath or is not tachypneic. At this point and feel pursuing pulmonary embolism is warranted. I did speak with Dr. Hutchins Person we will get the patient.     [CF]      ED Course User Index  [CF] Skye Kingston,        --------------------------------------------- PAST HISTORY ---------------------------------------------  Past Medical History:  has a past medical history of CAD (coronary artery disease), COPD (chronic obstructive pulmonary disease) ([facility] [address] Hyperlipidemia, Hypertension, Lung cancer ([facility] [address] and MI (mitral incompetence). Past Surgical History:  has a past surgical history that includes angioplasty (03/2017); Coronary angioplasty with stent (2009); thoracentesis (Left, 09/24/2017); and Lung biopsy. Social History:  reports that he quit smoking about 19 months ago. His smoking use included cigarettes. He started smoking about 47 years ago. He has a 90.00 pack-year smoking history. He has never used smokeless tobacco. He reports that he drinks about 3.6 oz of alcohol per week. He reports that he does not use drugs. Family History: family history includes Cancer in his mother. The patients home medications have been reviewed. Allergies: Patient has no known allergies.     -------------------------------------------------- RESULTS -------------------------------------------------    LABS:  Results for orders placed or performed during the hospital encounter of 04/29/19   CBC Auto Differential   Result Value Ref Range    WBC 19.7 (H) 4.5 - 11.5 E9/L    RBC 3.61 (L) 3.80 - 5.80 E12/L    Hemoglobin 11.7 (L) 12.5 - 16.5 g/dL    Hematocrit 38.1 37.0 - 54.0 %    .5 (H) 80.0 - 99.9 fL    MCH 32.4 26.0 - 35.0 pg    MCHC 30.7 (L) 32.0 - 34.5 %    RDW 18.7 (H) 11.5 - 15.0 fL    Platelets 681 830 - 114 E9/L    MPV 10.8 7.0 - 12.0 fL   Comprehensive Metabolic Panel   Result Value Ref Range    Sodium 137 132 - 146 mmol/L    Potassium 3.4 (L) 3.5 - 5.0 mmol/L    Chloride 94 (L) 98 - 107 mmol/L    CO2 33 (H) 22 - 29 mmol/L    Anion Gap 10 7 - 16 mmol/L    Glucose 111 (H) 74 - 99 mg/dL    BUN 10 8 - 23 mg/dL    CREATININE 0.9 0.7 - 1.2 mg/dL    GFR Non-African American >60 >=60 mL/min/1.73    GFR African American >60     Calcium 7.6 (L) 8.6 - 10.2 mg/dL    Total Protein 4.5 (L) 6.4 - 8.3 g/dL    Alb 2.5 (L) 3.5 - 5.2 g/dL    Total Bilirubin 1.7 (H) 0.0 - 1.2 mg/dL    Alkaline Phosphatase 483 (H) 40 - 129 U/L    ALT 36 0 - 40 U/L    AST 55 (H) 0 - 39 U/L   Lactic Acid, Plasma   Result Value Ref Range    Lactic Acid 4.9 (HH) 0.5 - 2.2 mmol/L   Magnesium   Result Value Ref Range    Magnesium 1.7 1.6 - 2.6 mg/dL       RADIOLOGY:  Xr Chest Portable    Result Date: 4/15/2019  Patient MRN: [id number] : 1955 Age:  59 years Gender: Male Order Date: 4/15/2019 3:45 PM Exam: XR CHEST PORTABLE Number of Images: 1 view Indication:   sob Comparison: 10/12/2017 Findings: A portable erect view of the chest, taken at 1551 hours, shows a right jugular infusion catheter which follows the course of the superior vena cava. The tip may be near the atrial caval junction. The heart does not appear to be enlarged. There appears to be deviation of the tracheal air shadow towards the left, however, this is likely related to mild rotation of the patient towards the left. There is pleural density in the left upper lung zone and at the left lung base. This may represent pleural scarring, as there was extensive pleural and parenchymal density in the left hemithorax on the study of 2017. I cannot exclude a pleural effusion at this time. There is probable atelectasis in the left upper lung zone. The right lung is expanded and clear. There is no apparent pneumothorax or free air beneath the diaphragm. Right jugular infusion catheter which appears to be within the distal superior vena cava. Pleural density in the left upper and lower lung zones, more extensive superiorly, possibly representing pleural scarring, however, recurrent effusion cannot be excluded. No definite pneumonic consolidation or vascular congestion.     Ct Chest Pulmonary Embolism W Contrast    Result Date: 4/15/2019  Patient MRN:  [id number] : 1955 Age: 59 years Gender: Male Order Date:  4/15/2019 5:15 PM EXAM: CT CHEST PULMONARY EMBOLISM W CONTRAST number of images 443 including MIP coronal and sagittal reconstruction images. Contrast. Isovue-370, 75 mL intravenously. INDICATION:  SOB, dizziness, tachycardia, r/o PE or effusion  COMPARISON: Previous CT scan September 22, 2017 FINDINGS: The heart and the great vessels are unremarkable. The trachea and major bronchi are patent. Moderately enlarged lymph nodes are identified in the mediastinum and hilum with lymph nodes measuring up to 1.2 cm in the subcarinal region and smaller 1's in the hilum. Atherosclerotic changes are identified in the aorta. There are no filling defects in the main pulmonary artery and the central branches to suggest  pulmonary embolism. The previously noted infiltrative mass in the left upper lobe currently measures 5 x 5.5 cm abutting the aortic arch, left pulmonary artery and narrowing of the left upper lobe bronchus extending to the chest wall. This may represent a combination of malignancy with fibrosis and scarring. There is persistent atelectasis and pleural effusion in the lower lobes bilaterally with a marginal improvement. There is underlying COPD. There is heterogeneous appearance of liver concerning for cirrhosis with a moderate amount of ascites. Persistent indeterminate multiple cystic lesions are identified in the liver. Nonspecific sclerotic lesions are identified in the ribs and thoracic spine concerning for bone metastasis likely new. No central pulmonary embolism or aortic dissection. Persistent infiltrative mass in the left upper lobe extending to left hilum which is marginally improved concerning for persistent malignancy with  fibrosis and scarring with  pleural effusion and atelectasis in the lower lobes. Developing bone metastasis. Liver cirrhosis with multiple indeterminate low-attenuation lesions and moderate amount of ascites. EKG: This EKG is signed and interpreted by me.     Rate: 131  Rhythm: Sinus  Interpretation: sinus tachycardia  Comparison: changes compared to previous EKG and no previous EKG

## 2019-04-29 NOTE — ED NOTES
[facility] faxed to floor spoke with Rory Hathaway, copy of fax verificarion received and in chart, pt. to be transported to floor on portable cardiac monitor by ED staff.      Gage Richards RN  04/29/19 [id number]

## 2019-04-30 NOTE — PATIENT CARE CONFERENCE
[facility] [place] Quality Flow/Interdisciplinary Rounds Progress Note        Quality Flow Rounds held on April 30, 2019    Disciplines Attending:  Bedside Nurse, ,  and Nursing Unit 205 Sarah Guaman was admitted on 4/29/2019  3:31 PM    Anticipated Discharge Date:  Expected Discharge Date: 05/01/19    Disposition:    Sven Score:  Sven Scale Score: 19    Readmission Risk              Risk of Unplanned Readmission:        14           Discussed patient goal for the day, patient clinical progression, and barriers to discharge.   The following Goal(s) of the Day/Commitment(s) have been identified:  Diagnostics - Report Results      Robby Campos  April 30, 2019

## 2019-04-30 NOTE — CONSULTS
Pulmonary Consultation    Admit Date: 4/29/2019  Requesting Physician: No primary care provider on file. CC:  · PTE  HPI:  · Belle Barksdale is a 81-year-old white male with known non-small cell carcinoma known to me from his previous diagnosis and a thoracentesis. · Patient presented to this hospital because his oncologist with a necrotic toe on his right foot. Seen in the emergency room, the patient was admitted and podiatry was consulted. Infectious disease was consulted as well and the patient was placed on antibiotics. · Patient plane of right-sided chest pain with associated shortness of breath. Because of his underlying malignancy a CT-A was obtained. It evidenced bilateral pulmonary emboli is seen below. PMH:    Past Medical History:   Diagnosis Date    CAD (coronary artery disease)     COPD (chronic obstructive pulmonary disease) ([facility] [address]     Hyperlipidemia     Hypertension     Lung cancer ([facility] [address]     MI (mitral incompetence)      PSH:   Past Surgical History:   Procedure Laterality Date    ANGIOPLASTY  03/2017    lower legs    CORONARY ANGIOPLASTY WITH STENT PLACEMENT  2009    stents placed    LUNG BIOPSY      THORACENTESIS Left 09/24/2017       Review of Systems:     · Constitutional: As noted in the HPI. Denies fever or chills. · Eyes: No visual changes or diplopia. No scleral icterus. · ENT: No headaches, hearing loss or vertigo. No nasal congestion, or sore throat. · Cardiovascular: No chest pain, dyspnea on exertion, or palpitations. · Respiratory: See above  · Gastrointestinal: No abdominal pain, nausea or emesis. No diarrhea or rectal bleeding or melena. No change in bowel habits. · Genitourinary: No dysuria, urinary frequency, or incontinence. No hematuria. · Musculoskeletal: No gait disturbance, weakness or joint complaints. · Integumentary: No rash or pruritis. No abnormal pigmentation, hair or nail changes.   · Neurological: No headache, diplopia, dizziness, tremor, change in muscle strength, numbness or tingling. No change in gait, balance, coordination, mood, affect, memory, mentation, behavior. · Psychiatric: No anxiety or depression. · Endocrine: No temperature intolerance, excessive thirst, fluid intake, urinary frequency, excessive appetite, or recent weight change. · Hematologic/Lymphatic: See above  · Allergic/Immunologic: No seasonal or perenial allergies. No history of hives or atopic dermatitis. Social History:  · Alcohol:  No  · Tobacco:   Past  · Employment:  no silica or asbestos exposure  · Family:  No family history of lung disease    Medications:   heparin (porcine) 18 Units/kg/hr (19 1629)    sodium chloride 1,000 mL (19 1458)      ibuprofen  400 mg Oral TID    vancomycin  1,250 mg Intravenous Q12H    aspirin  81 mg Oral Once per day on u    atorvastatin  40 mg Oral Nightly    furosemide  40 mg Oral QAM    gabapentin  300 mg Oral Nightly    metoprolol tartrate  12.5 mg Oral BID    therapeutic multivitamin-minerals  1 tablet Oral Weekly    piperacillin-tazobactam  3.375 g Intravenous Q8H       Vitals:  Tmax:  VITALS:  /64   Pulse 104   Temp 99 °F (37.2 °C) (Oral)   Resp 16   Ht 6' (1.829 m)   Wt 169 lb 8.5 oz (76.9 kg)   SpO2 97%   BMI 22.99 kg/m²   24HR INTAKE/OUTPUT:      Intake/Output Summary (Last 24 hours) at 2019  Last data filed at 2019 1808  Gross per 24 hour   Intake 1160 ml   Output 750 ml   Net 410 ml     CURRENT PULSE OXIMETRY:  SpO2: 97 %  24HR PULSE OXIMETRY RANGE:  SpO2  Av.9 %  Min: 79 %  Max: 98 %    EXAM:  General: No distress. Alert. Eyes: PERRL. No sclera icterus. No conjunctival injection. ENT: No discharge. Pharynx clear. Neck: Trachea midline. Normal thyroid. No jvd, no hjr. Resp: No wheezing. No accessory muscle use. No rales. No rhonchi. CV: Regular rate. Regular rhythm. No murmur No rub. Abd: Non-tender. Non-distended. No masses. No organmegaly. ABNORMAL REPORT   1. Suspected infiltrative mass/malignancy left hilar and suprahilar   region. 2. Multifocal airspace disease left upper lung and bilateral lung   bases could be reflective of atelectasis/scarring and/or   infiltrate/pneumonia with suspected small bilateral pleural effusions. 3. Underlying mild central pulmonary vascular congestion. XR FOOT RIGHT (MIN 3 VIEWS)   Final Result   Mild dorsal forefoot soft tissue swelling and degenerative changes of   the first MTP joint without other significant findings. .        Assessment:  1. Bilateral pulmonary emboli  2. Thrombophilia of malignancy  3. Necrotic toe  4. History of non-small cell carcinoma the lung  5. Abdominal fluid: Unclear etiology but it appears to surround the liver and is likely sympathetic effusion around the right lung. Plan:  1. Continue unfractionated heparin  2. Would stop unfractionated heparin for 6 hours prior to OR and resume once hemostasis is reassured  3. Once stable on unfractionated heparin, convert to apixaban  4. No intention at this point instrument the right hemithorax      Thanks for letting us see this patient in consultation. Please contact us with any questions. Office [phone] or after hours through [employer], x [phone]. Please note that voice recognition technology was used in the preparation of this note and make therefore it may contain inadvertent transcription errors    John Paul England M.D., F.C.C.P.     [facility], [address] [place] [address], [address], [facility]

## 2019-04-30 NOTE — CONSULTS
Inpatient consult to Podiatry  Consult performed by: Amol [email or web address]  Consult ordered by: Marylen Hawthorn, MD  Assessment/Recommendations: Podiatry Consult Note:    HPI:  Lucien Lindsey is a 59 y.o. male admitted for sepsis and gangrenous changes to his right great toe. Patient had an initial injury to the right great toe which subsequently with local wound care turned gangrenous over the last two weeks. Patient presented to the hospital for evaluation and admission regarding cellulitis present. Patient is resting comfortably in bed this morning, and is tolerating the IV antibiotics without issues. Past Medical History: Past Medical History:  No date: CAD (coronary artery disease)  No date: COPD (chronic obstructive pulmonary disease) (HCC)  No date: Hyperlipidemia  No date: Hypertension  No date: Lung cancer ([facility] [address]  No date: MI (mitral incompetence)    Past Surgical History: Past Surgical History:  03/2017: ANGIOPLASTY      Comment:  lower legs  2009: CORONARY ANGIOPLASTY WITH STENT PLACEMENT      Comment:  stents placed  No date: LUNG BIOPSY  09/24/2017: THORACENTESIS; Left    Family Medical History: Review of patient's family history indicates:  Problem: Cancer      Relation: Mother          Age of Onset: (Not Specified)          Comment: colon      Scheduled Medications: aspirin, 81 mg, Oral, Once per day on Mon Thu  atorvastatin, 40 mg, Oral, Nightly  furosemide, 40 mg, Oral, QAM  gabapentin, 300 mg, Oral, Nightly  metoprolol tartrate, 12.5 mg, Oral, BID  therapeutic multivitamin-minerals, 1 tablet, Oral, Weekly  piperacillin-tazobactam, 3.375 g, Intravenous, Q8H  vancomycin, 1,250 mg, Intravenous, Once       PRN Medications:  sodium chloride flush, oxyCODONE HCl     Allergies: Patient has no known allergies.       Family History:      Problem: Cancer      Relation: Mother          Age of Onset: (Not Specified)          Comment: colon        Review of Systems:  See chart as per admitting physician. Focused Lower Extremity Physical Exam:  ---------------------------               04/29/19 2000         ---------------------------   BP:         (!) 95/56       Pulse:         116          Resp:          18           Temp:   98.4 °F (36.9 °C)   SpO2:          92%         ---------------------------     Dry gangrenous changes noted to the distal third of the right great toe. Erythema and edema into the right dorsal foot. No abscess noted right foot, or skin crepitation to palpation.       Labs:  Lab Results       Component                Value               Date                       WBC                      19.2 (H)            04/29/2019                 HCT                      33.4 (L)            04/29/2019                 HGB                      10.6 (L)            04/29/2019                 PLT                      263                 04/29/2019                 NA                       137                 04/29/2019                 K                        3.4 (L)             04/29/2019                 CL                       94 (L)              04/29/2019                 CO2                      33 (H)              04/29/2019                 BUN                      10                  04/29/2019                 CREATININE               0.9                 04/29/2019                 GLUCOSE                  111 (H)             04/29/2019                 CRP                      10.0 (H)            04/29/2019            CBC: Lab Results       Component                Value               Date                       WBC                      19.2                04/29/2019                 RBC                      3.20                04/29/2019                 HGB                      10.6                04/29/2019                 HCT                      33.4                04/29/2019                 MCV                      104.4 04/29/2019                 MCH                      33.1                04/29/2019                 MCHC                     31.7                04/29/2019                 RDW                      19.8                04/29/2019                 PLT                      263                 04/29/2019                 MPV                      12.6                04/29/2019            Hemoglobin/Hematocrit:  Lab Results       Component                Value               Date                       HGB                      10.6                04/29/2019                 HCT                      33.4                04/29/2019            BMP:  Lab Results       Component                Value               Date                       NA                       137                 04/29/2019                 K                        3.4                 04/29/2019                 K                        3.2                 04/15/2019                 CL                       94                  04/29/2019                 CO2                      33                  04/29/2019                 BUN                      10                  04/29/2019                 LABALBU                  2.5                 04/29/2019                 CREATININE               0.9                 04/29/2019                 CALCIUM                  7.6                 04/29/2019                 GFRAA                    >60                 04/29/2019                 LABGLOM                  >60                 04/29/2019                 GLUCOSE                  111                 04/29/2019            PT/INR:  Lab Results       Component                Value               Date                       PROTIME                  12.7                09/22/2017                 INR                      1.2                 09/22/2017            HgBA1c:  No results found for: LABA1C            Assessment:  1.  Past Medical History:  No date: CAD (coronary artery disease)  No date: COPD (chronic obstructive pulmonary disease) (HCC)  No date: Hyperlipidemia  No date: Hypertension  No date: Lung cancer ([facility] [address]  No date: MI (mitral incompetence)    2. Patient Active Problem List:     Lung mass (R91.8)     Hyperlipidemia (E78.5)     MI (mitral incompetence) (I34.0)     Hypertension (I10)     Metastasis (HCC) (C79.9)     CAD (coronary artery disease) (I25.10)     Pleural effusion (J90)     Sepsis (HCC) (A41.9)     Infected abrasion of right foot (S90.811A, L08.9)          Plan:  1. Consultation and E&M  2. Discussed options with the patient in detail this morning and nursing staff. Will proceed with a partial amputation right great toe later today. 3. Thanks for consult.         @Electronically signed by Alba Lopez DPM on 4/30/2019 at 6:58 AM

## 2019-04-30 NOTE — PROGRESS NOTES
Spoke to Dr. Carline Hampton regarding CTA results. OR to be cancelled for today. Aguilar in OR made aware.     Electronically signed by Eliot Godinez RN on 4/30/2019 at 3:52 PM

## 2019-04-30 NOTE — PROGRESS NOTES
Update provided to [facility] in OR to make him aware of d dimer result and patient now going for stat CTA. Will update accordingly.     Electronically signed by Leoncio Weinstein RN on 4/30/2019 at 1:33 PM

## 2019-04-30 NOTE — PROGRESS NOTES
[facility]   Progress Note    Admitting Date and Time: 4/29/2019  3:31 PM  Admit Dx: Sepsis ([address] [A41.9]  Sepsis ([address] [A41.9]  Infected abrasion of right foot, initial encounter [Q03.445Z, L08.9]    Subjective: Admitted last evening with Infected right foot and Gangrenous right big toe. Seen by podiatry, planned for Partial amputation later today of big toe. Patient was admitted with Sepsis ([address] [A41.9]  Sepsis ([address] [A41.9]  Infected abrasion of right foot, initial encounter [S90.811A, L08.9]. Patient is awake, alert, comfortable. Still complains of right sided CP. Per RN: No issues over night. ROS: denies fever, chills, cp, sob, n/v, HA unless stated above.      aspirin  81 mg Oral Once per day on Mon Thu    atorvastatin  40 mg Oral Nightly    furosemide  40 mg Oral QAM    gabapentin  300 mg Oral Nightly    metoprolol tartrate  12.5 mg Oral BID    therapeutic multivitamin-minerals  1 tablet Oral Weekly    piperacillin-tazobactam  3.375 g Intravenous Q8H       sodium chloride flush 10 mL PRN   oxyCODONE HCl 10 mg Q4H PRN        Objective:    /78   Pulse 120   Temp 98.2 °F (36.8 °C) (Oral)   Resp 16   Ht 6' (1.829 m)   Wt 169 lb 8.5 oz (76.9 kg)   SpO2 98%   BMI 22.99 kg/m²   General Appearance: alert and oriented to person, place and time, well-developed and well-nourished, in no acute distress  Skin: warm and dry, no rash or erythema  Head: normocephalic and atraumatic  Eyes: pupils equal, round, and reactive to light, extraocular eye movements intact, conjunctivae normal  ENT: tympanic membrane, external ear and ear canal normal bilaterally, oropharynx clear and moist with normal mucous membranes  Neck: neck supple and non tender without mass, no thyromegaly or thyroid nodules, no cervical lymphadenopathy   Pulmonary/Chest: clear to auscultation bilaterally- no wheezes, rales or rhonchi, normal air movement, no respiratory distress  Cardiovascular: normal rate, normal S1 and S2, no gallops, intact distal pulses and no carotid bruits  Abdomen: soft, non-tender, non-distended, normal bowel sounds, no masses or organomegaly      Recent Labs     04/29/19  1649 04/30/19  0425    137   K 3.4* 3.8   CL 94* 99   CO2 33* 27   BUN 10 10   CREATININE 0.9 0.9   GLUCOSE 111* 97   CALCIUM 7.6* 7.1*       Recent Labs     04/29/19  1649 04/29/19  2302   WBC 19.7* 19.2*   RBC 3.61* 3.20*   HGB 11.7* 10.6*   HCT 38.1 33.4*   .5* 104.4*   MCH 32.4 33.1   MCHC 30.7* 31.7*   RDW 18.7* 19.8*    263   MPV 10.8 12.6*           Radiology:   XR CHEST PORTABLE   Final Result   ALERT:  THIS IS AN ABNORMAL REPORT   1. Suspected infiltrative mass/malignancy left hilar and suprahilar   region. 2. Multifocal airspace disease left upper lung and bilateral lung   bases could be reflective of atelectasis/scarring and/or   infiltrate/pneumonia with suspected small bilateral pleural effusions. 3. Underlying mild central pulmonary vascular congestion. XR FOOT RIGHT (MIN 3 VIEWS)   Final Result   Mild dorsal forefoot soft tissue swelling and degenerative changes of   the first MTP joint without other significant findings. Assessment:    Active Problems:    Sepsis ([address]    Infected abrasion of right foot  Resolved Problems:    * No resolved hospital problems. *      Plan:  1. Gangrenous distal right bit toe / For partial amputation today. 2. Right foot Cellulitis / On Zosyn and Vancomycin IV / ID on board  3. Right sided CP / D Dimer / NSAIDS  4. Ca Lung / In Remission / will ask his Oncology to follow with pain / will also get alk phos.   5. Will continue with lasix        Electronically signed by Shalonda Emmanuel MD on 4/30/2019 at 10:07 AM

## 2019-04-30 NOTE — PROGRESS NOTES
Department of Podiatry  Attending Progress Note          HISTORY OF PRESENT ILLNESS:                The patient is a 59 y.o. male who is seen today after cancelling surgery due to acute PE on scans performed today. Will follow for continued wound care. PHYSICAL EXAM:      Vitals:    /64   Pulse 104   Temp 99 °F (37.2 °C) (Oral)   Resp 16   Ht 6' (1.829 m)   Wt 169 lb 8.5 oz (76.9 kg)   SpO2 97%   BMI 22.99 kg/m²     Lower extremity focused examination:        Assessment:    Patient Active Problem List   Diagnosis Code    Lung mass R91.8    Hyperlipidemia E78.5    MI (mitral incompetence) I34.0    Hypertension I10    Metastasis (HCC) C79.9    CAD (coronary artery disease) I25.10    Pleural effusion J90    Sepsis ([address] A41.9    Infected abrasion of right foot S90.811A, L08.9         Plan:  1. Surgery cancelled today. Will await Pulmonary clearance. Discussed case with the patients family this afternoon. 2. Area dressed right foot and discussed with nursing staff.   Will follow for continued treatment.      @ Electronically signed by Karishma Armas DPM on 4/30/2019 at 4:34 PM

## 2019-04-30 NOTE — PLAN OF CARE
Problem: Pain:  Goal: Pain level will decrease  Description  Pain level will decrease  4/30/2019 1243 by Ghazala Tomas RN  Outcome: Met This Shift  4/30/2019 1243 by Ghazala Tomas RN  Outcome: Met This Shift  Goal: Control of acute pain  Description  Control of acute pain  4/30/2019 1243 by Ghazala Tomas RN  Outcome: Met This Shift  4/30/2019 1243 by Ghazala Tomas RN  Outcome: Met This Shift  Goal: Control of chronic pain  Description  Control of chronic pain  4/30/2019 1243 by Ghazala Tomas RN  Outcome: Met This Shift  4/30/2019 1243 by Ghazala Tomas RN  Outcome: Met This Shift     Problem: Falls - Risk of:  Goal: Will remain free from falls  Description  Will remain free from falls  4/30/2019 1243 by Ghazala Tomas RN  Outcome: Met This Shift  4/30/2019 1243 by Ghazala Tomas RN  Outcome: Met This Shift  Goal: Absence of physical injury  Description  Absence of physical injury  4/30/2019 1243 by Ghazala Tomas RN  Outcome: Met This Shift  4/30/2019 1243 by Ghazala Tomas RN  Outcome: Met This Shift     Problem: Skin Integrity:  Goal: Will show no infection signs and symptoms  Description  Will show no infection signs and symptoms  Outcome: Met This Shift  Goal: Absence of new skin breakdown  Description  Absence of new skin breakdown  Outcome: Met This Shift

## 2019-04-30 NOTE — PROGRESS NOTES
Spoke to Dr. Severa Genera regarding consult. information provided.     Electronically signed by Femi Schwab RN on 4/30/2019 at 3:55 PM

## 2019-04-30 NOTE — CONSULTS
furosemide  40 mg Oral QAM    gabapentin  300 mg Oral Nightly    metoprolol tartrate  12.5 mg Oral BID    therapeutic multivitamin-minerals  1 tablet Oral Weekly    piperacillin-tazobactam  3.375 g Intravenous Q8H     Continuous Infusions:   sodium chloride 1,000 mL (19)     PRN Meds:sodium chloride flush, oxyCODONE HCl    Allergies:  Patient has no known allergies. Social History:   Social History     Socioeconomic History    Marital status: Single     Spouse name: None    Number of children: None    Years of education: None    Highest education level: None   Occupational History    None   Social Needs    Financial resource strain: None    Food insecurity:     Worry: None     Inability: None    Transportation needs:     Medical: None     Non-medical: None   Tobacco Use    Smoking status: Former Smoker     Packs/day: 2.00     Years: 45.00     Pack years: 90.00     Types: Cigarettes     Start date: 1971     Last attempt to quit: 9/15/2017     Years since quittin.6    Smokeless tobacco: Never Used   Substance and Sexual Activity    Alcohol use:  Yes     Alcohol/week: 3.6 oz     Types: 6 Cans of beer per week     Comment: wkly-no acohol in six weeks    Drug use: No    Sexual activity: None   Lifestyle    Physical activity:     Days per week: None     Minutes per session: None    Stress: None   Relationships    Social connections:     Talks on phone: None     Gets together: None     Attends Christian service: None     Active member of club or organization: None     Attends meetings of clubs or organizations: None     Relationship status: None    Intimate partner violence:     Fear of current or ex partner: None     Emotionally abused: None     Physically abused: None     Forced sexual activity: None   Other Topics Concern    None   Social History Narrative    None      Pets: Cats   Travel: No    Family History:       Problem Relation Age of Onset    Cancer Mother colon   . Otherwise non-pertinent to the chief complaint. REVIEW OF SYSTEMS:    Constitutional: Negative  for fevers, chills, diaphoresis  Neurologic: Negative   Psychiatric: Negative  Rheumatologic: Negative   Endocrine: Negative  Hematologic: Negative  Immunologic: Negative   ENT: Negative  Respiratory:Lung cancer   Cardiovascular: Negative  GI: Negative  : Negative  Musculoskeletal:As needed. I   Skin: No rashes. PHYSICAL EXAM:    Vitals:   /78   Pulse 120   Temp 98.2 °F (36.8 °C) (Oral)   Resp 16   Ht 6' (1.829 m)   Wt 169 lb 8.5 oz (76.9 kg)   SpO2 98%   BMI 22.99 kg/m²   Constitutional: The patient is awake, alert, and oriented. Visitor present. Skin: Warm and dry. No rashes were noted. HEENT: Eyes show round, and reactive pupils. No jaundice. Moist mucous membranes, no ulcerations, no thrush. Neck: Supple to movements. No lymphadenopathy. Chest: No use of accessory muscles to breathe. Symmetrical expansion. Auscultation reveals no wheezing, crackles, or rhonchi. Right Mediport accessed   Cardiovascular: S1 and S2 are rhythmic and regular. No murmurs appreciated. Abdomen: Positive bowel sounds to auscultation. Benign to palpation. No masses felt. No hepatosplenomegaly. Extremities: N bilateral foot edema. The right foot is necrotic necrosis extends towards the plantar aspect. Minimal odor.    Lines: peripheral      CBC+dif:  Recent Labs     04/29/19  1649 04/29/19  2302   WBC 19.7* 19.2*   HGB 11.7* 10.6*   HCT 38.1 33.4*   .5* 104.4*    263   NEUTROABS 16.55* 16.70*     Lab Results   Component Value Date    CRP 9.4 (H) 04/30/2019    CRP 10.0 (H) 04/29/2019      No results found for: CRPHS  Lab Results   Component Value Date    SEDRATE 2 04/30/2019    SEDRATE 0 04/29/2019     Lab Results   Component Value Date    ALT 28 04/30/2019    AST 44 (H) 04/30/2019    ALKPHOS 412 (H) 04/30/2019    BILITOT 1.5 (H) 04/30/2019     Lab Results   Component Value Date     04/30/2019    K 3.8 04/30/2019    K 3.2 04/15/2019    CL 99 04/30/2019    CO2 27 04/30/2019    BUN 10 04/30/2019    CREATININE 0.9 04/30/2019    GFRAA >60 04/30/2019    LABGLOM >60 04/30/2019    GLUCOSE 97 04/30/2019    PROT 3.8 04/30/2019    LABALBU 1.8 04/30/2019    CALCIUM 7.1 04/30/2019    BILITOT 1.5 04/30/2019    ALKPHOS 412 04/30/2019    AST 44 04/30/2019    ALT 28 04/30/2019       Lab Results   Component Value Date    PROTIME 12.7 09/22/2017    INR 1.2 09/22/2017       Lab Results   Component Value Date    TSH 2.550 12/06/2018       Lab Results   Component Value Date    COLORU Yellow 04/29/2019    PHUR 5.0 04/29/2019    CLARITYU Clear 04/29/2019    SPECGRAV 1.015 04/29/2019    LEUKOCYTESUR Negative 04/29/2019    UROBILINOGEN 0.2 04/29/2019    BILIRUBINUR Negative 04/29/2019    BLOODU Negative 04/29/2019    GLUCOSEU Negative 04/29/2019       Lab Results   Component Value Date    PH 7.427 09/24/2017     Radiology:  Noted    Microbiology:  Pending  No results for input(s): BC in the last 72 hours. No results for input(s): ORG in the last 72 hours. No results for input(s): Veatrice Banker in the last 72 hours. No results for input(s): STREPNEUMAGU in the last 72 hours. No results for input(s): LP1UAG in the last 72 hours. Recent Labs     04/29/19  1649 04/30/19  0425   ASO 71 66     No results for input(s): CULTRESP in the last 72 hours. No results for input(s): PROCAL in the last 72 hours. Assessment:  · Gangrene right 1st toe  · Possible acute osteomyelitis right 1st toe  · Lung cancer, undergoing chemotherapy  · Leukocytosis associated to the above    Plan:    · Continue Vancomycin and Zosyn  · For amputation of the right 1st toe today  · Will follow with you    Thank you for having us see this patient in consultation. I will be discussing this case with the treating physicians.     Robert Alcocer  11:50 AM  4/30/2019

## 2019-05-01 PROBLEM — I96 GANGRENE OF TOE OF RIGHT FOOT (HCC): Status: ACTIVE | Noted: 2019-01-01

## 2019-05-01 PROBLEM — I26.99 ACUTE PULMONARY EMBOLISM (HCC): Status: ACTIVE | Noted: 2019-01-01

## 2019-05-01 NOTE — PROGRESS NOTES
Patient PTT came back >240. Heparin drip paused 1 hour and will restart at 3 units/kg/hr slower than previous rate, per protocol. Next PTT to be drawn at 0600.     Electronically signed by Leonardo Benjamin RN on 5/1/2019 at 12:44 AM

## 2019-05-01 NOTE — PROGRESS NOTES
0 Dr Melissa Ghosh here and update don all vitals , ok for pt to return to room 36 , son updated and sent up topts room

## 2019-05-01 NOTE — CARE COORDINATION
Introduced my self and provided explanation of CM role to patient. Patient is awake, alert, and aware of current diagnosis and treatment plan including iv heparin gtt, iv atb, toe amp in or per podiatry. Patient has intentions of going home post discharge and is open to [place] [id number]. Explained ELOS of 48 hours; patient voiced understanding and agreement. Will follow along with  and assist with discharge planning as necessary. Simba Toth.  Robbie, MSN, RN  [facility] Case Management  [phone]

## 2019-05-01 NOTE — PLAN OF CARE
Problem: Pain:  Goal: Pain level will decrease  Description  Pain level will decrease  4/30/2019 2339 by Shela Hashimoto, RN  Outcome: Met This Shift     Problem: Pain:  Goal: Control of acute pain  Description  Control of acute pain  4/30/2019 2339 by Shela Hashimoto, RN  Outcome: Met This Shift     Problem: Pain:  Goal: Control of chronic pain  Description  Control of chronic pain  4/30/2019 2339 by Shela Hashimoto, RN  Outcome: Met This Shift     Problem: Falls - Risk of:  Goal: Will remain free from falls  Description  Will remain free from falls  4/30/2019 2339 by Shela Hashimoto, RN  Outcome: Met This Shift     Problem: Falls - Risk of:  Goal: Absence of physical injury  Description  Absence of physical injury  4/30/2019 2339 by Shela Hashimoto, RN  Outcome: Met This Shift     Problem: Skin Integrity:  Goal: Will show no infection signs and symptoms  Description  Will show no infection signs and symptoms  4/30/2019 2339 by Shela Hashimoto, RN  Outcome: Met This Shift     Problem: Skin Integrity:  Goal: Absence of new skin breakdown  Description  Absence of new skin breakdown  4/30/2019 2339 by Shela Hashimoto, RN  Outcome: Met This Shift     Problem: Risk for Impaired Skin Integrity  Goal: Tissue integrity - skin and mucous membranes  Description  Structural intactness and normal physiological function of skin and  mucous membranes.   Outcome: Met This Shift

## 2019-05-01 NOTE — PATIENT CARE CONFERENCE
[facility] Quality Flow/Interdisciplinary Rounds Progress Note        Quality Flow Rounds held on May 1, 2019    Disciplines Attending:  Bedside Nurse, ,  and Nursing Unit Leadership    Shaunna Vasquez was admitted on 4/29/2019  3:31 PM    Anticipated Discharge Date:  Expected Discharge Date: 05/03/19    Disposition:    Sven Score:  Sven Scale Score: 17    Readmission Risk              Risk of Unplanned Readmission:        12           Discussed patient goal for the day, patient clinical progression, and barriers to discharge.   The following Goal(s) of the Day/Commitment(s) have been identified:  Diagnostics - Report Results and Labs - Report Results      Ace Carty  May 1, 2019

## 2019-05-01 NOTE — ANESTHESIA PRE PROCEDURE
liquid consumption: 0800                        Time of last solid consumption: 0800                        Date of last liquid consumption: 19                        Date of last solid food consumption: 19    BMI:   Wt Readings from Last 3 Encounters:   19 170 lb 2 oz (77.2 kg)   04/15/19 144 lb (65.3 kg)   18 156 lb (70.8 kg)     Body mass index is 23.07 kg/m². CBC:   Lab Results   Component Value Date    WBC 30.4 2019    RBC 3.33 2019    HGB 10.8 2019    HCT 34.9 2019    .8 2019    RDW 18.0 2019     2019       CMP:   Lab Results   Component Value Date     2019    K 3.0 2019    K 3.2 04/15/2019    CL 96 2019    CO2 29 2019    BUN 9 2019    CREATININE 0.9 2019    GFRAA >60 2019    LABGLOM >60 2019    GLUCOSE 106 2019    PROT 3.8 2019    CALCIUM 6.7 2019    BILITOT 1.5 2019    ALKPHOS 415 2019    AST 47 2019    ALT 29 2019       POC Tests: No results for input(s): POCGLU, POCNA, POCK, POCCL, POCBUN, POCHEMO, POCHCT in the last 72 hours.     Coags:   Lab Results   Component Value Date    PROTIME 12.7 2017    INR 1.2 2017    APTT 181.6 2019       HCG (If Applicable): No results found for: PREGTESTUR, PREGSERUM, HCG, HCGQUANT     ABGs: No results found for: PHART, PO2ART, LEP8URV, IUJ0MEX, BEART, S4ACFBNO     Type & Screen (If Applicable):  No results found for: LABABO, [address]    Anesthesia Evaluation  Patient summary reviewed no history of anesthetic complications:   Airway: Mallampati: II  TM distance: >3 FB   Neck ROM: full  Mouth opening: > = 3 FB Dental: normal exam         Pulmonary:   (+) COPD:  decreased breath sounds,                            ROS comment: Lung mass with Metastasis  Pleural effusion  Quit Smokin/15/17   Cardiovascular:    (+) hypertension:, valvular problems/murmurs: MR, CAD:, CABG/stent (CORONARY ANGIOPLASTY WITH STENT PLACEMENT):,         Rhythm: regular  Rate: normal                    Neuro/Psych:   Negative Neuro/Psych ROS              GI/Hepatic/Renal: Neg GI/Hepatic/Renal ROS            Endo/Other:    (+) blood dyscrasia (HGB 10.8): anemia:., electrolyte abnormalities (K+ 3.0  /  Ca+ 6.7), . Abdominal:           Vascular:   + PE.        ROS comment: Gangrene of toe of right foot . Anesthesia Plan      MAC     ASA 3       Induction: intravenous. Anesthetic plan and risks discussed with patient. Plan discussed with CRNA.                   Bee Gerber MD   5/1/2019

## 2019-05-01 NOTE — PROGRESS NOTES
Dr. Amanuel Bryan notified that  Dr. Montalvo Homes seen the patient and his recommendations .  Amadna Ibarra

## 2019-05-01 NOTE — PROGRESS NOTES
Spoke with Dr Randy Raygoza informed of elevated heart rate and being transferred back to med-surg bed. Order obtained to give remaining 800 ml of 1/2 Normal saline.

## 2019-05-01 NOTE — PROGRESS NOTES
exposed extremities. Ext: No cyanosis, clubbing, edema  Lymph: No cervical LAD. No supraclavicular LAD. M/S: No cyanosis. No joint deformity. No clubbing. Neuro: Awake. Follows commands. Positive pupils/gag/corneals. Normal pain response. Results:  CBC:   Recent Labs     04/29/19  2302 04/30/19  1600 05/01/19  0541   WBC 19.2* 23.5* 30.4*   HGB 10.6* 10.9* 10.8*   HCT 33.4* 35.1* 34.9*   .4* 103.8* 104.8*    243 291     BMP:   Recent Labs     04/29/19  1649 04/30/19  0425 05/01/19  0541    137 133   K 3.4* 3.8 3.0*   CL 94* 99 96*   CO2 33* 27 29   BUN 10 10 9   CREATININE 0.9 0.9 0.9     LIVER PROFILE:   Recent Labs     04/29/19  1649 04/30/19  1055 05/01/19  0541   AST 55* 44* 47*   ALT 36 28 29   BILIDIR  --  0.8*  --    BILITOT 1.7* 1.5* 1.5*   ALKPHOS 483* 412* 415*     PT/INR: No results for input(s): PROTIME, INR in the last 72 hours. APTT:   Recent Labs     04/30/19  1600 04/30/19  2325 05/01/19  0722   APTT 44.3* >240.0* 181.6*         Pathology:  1. N/A      Microbiology:  1. None    Recent ABG:   No results for input(s): PH, PO2, PCO2, HCO3, BE, O2SAT, METHB, O2HB, COHB, O2CON, HHB, THB in the last 72 hours. Recent Films:  CTA CHEST W CONTRAST   Final Result   Significant amount of collapse of the left upper lobe with a rind of   pleural thickening, atelectasis and suspected tumor. The tumor appears   to encase the left upper lobe pulmonary artery and does encase some of   the left upper lobe bronchus. This appears unchanged. Moderate size right pleural effusion and small to moderate sized left   pleural effusion. Areas of infiltrate in the right hemithorax with new area in the right   middle lobe and worsening right basilar atelectasis.       Development of small acute embolus to a left lower lobe branch as well   as moderate sized embolus to the distal main -proximal right lower   lobe pulmonary artery branch and thrombus in the right middle lobe

## 2019-05-01 NOTE — BRIEF OP NOTE
Brief Postoperative Note  ______________________________________________________________    Patient: Sreedhar Morrow  YOB: 1955  MRN: [id number]  Date of Procedure: 5/1/2019    Pre-Op Diagnosis: Gangrene right great toe    Post-Op Diagnosis: Same       Procedure(s):  PARTIAL AMPUTATION RIGHT GREAT TOE    Anesthesia: Monitor Anesthesia Care    Surgeon(s):  Dona Powers DPM    Assistant: None    Estimated Blood Loss (mL): Minimal    Complications: None    Specimens:   ID Type Source Tests Collected by Time Destination   A : RIGHT GREAT TOE Tissue Tissue SURGICAL PATHOLOGY Dona Powers DPM 5/1/2019 1719        Implants:  * No implants in log *      Drains: * No LDAs found *    Findings: Dry gangrenous changes to the distal aspect right great toe    Dona Messina DPM  Date: 5/1/2019  Time: 5:38 PM

## 2019-05-01 NOTE — PROGRESS NOTES
Per Dr. Max Oquendo, ok for patient to eat breakfast.  For OR later this evening around 4-5pm.  Stop Heparin drip per Dr. Monik Schafer recommendation 6 hours before procedure - 11 am.    Electronically signed by Luis Ojeda RN on 5/1/2019 at 6:50 AM

## 2019-05-01 NOTE — PROGRESS NOTES
[facility]   [facility]    Admitting Date and Time: 4/29/2019  3:31 PM  Admit Dx: Sepsis ([facility] [A41.9]  Sepsis ([facility] [A41.9]  Infected abrasion of right foot, initial encounter [K63.907P, L08.9]    Subjective:    Patient was admitted with Sepsis ([facility] [A41.9]  Sepsis ([facility] [facility] [A41.9]  Infected abrasion of right foot, initial encounter [S90.811A, L08.9]. Patient feels pain in the right leg. Breathing a little better today, some pain on right chest with deep breathing. Per RN: to OR at 3pm    ROS: denies fever, chills, cp, sob, n/v, HA unless stated above.      potassium chloride  10 mEq Intravenous Q1H    ibuprofen  400 mg Oral TID    vancomycin  1,250 mg Intravenous Q12H    aspirin  81 mg Oral Once per day on Mon Thu    atorvastatin  40 mg Oral Nightly    furosemide  40 mg Oral QAM    gabapentin  300 mg Oral Nightly    metoprolol tartrate  12.5 mg Oral BID    therapeutic multivitamin-minerals  1 tablet Oral Weekly    piperacillin-tazobactam  3.375 g Intravenous Q8H       heparin (porcine) 80 Units/kg PRN   heparin (porcine) 40 Units/kg PRN   sodium chloride flush 10 mL PRN   oxyCODONE HCl 10 mg Q4H PRN        Objective:    BP 98/60   Pulse 66   Temp 98.9 °F (37.2 °C) (Oral)   Resp 18   Ht 6' (1.829 m)   Wt 170 lb 2 oz (77.2 kg)   SpO2 92%   BMI 23.07 kg/m²   General Appearance: alert and oriented to person, place and time, with anxious affect, frail-appearing, disheveled and appears older than stated age  Skin: black, gangrenous right great toe  Head: normocephalic and atraumatic  Pulmonary/Chest: wheezing present- on expiratoin on right and decreased breath sounds noted- thorughout  Cardiovascular: normal rate, regular rhythm, normal S1 and S2, no gallops and intact distal pulses  Abdomen: soft, non-tender, non-distended, normal bowel sounds, no masses or organomegaly  Musculoskeletal: normal range of motion, no joint swelling, deformity or tenderness      Recent Labs 04/29/19  1649 04/30/19  0425 05/01/19  0541    137 133   K 3.4* 3.8 3.0*   CL 94* 99 96*   CO2 33* 27 29   BUN 10 10 9   CREATININE 0.9 0.9 0.9   GLUCOSE 111* 97 106*   CALCIUM 7.6* 7.1* 6.7*       Recent Labs     04/29/19  2302 04/30/19  1600 05/01/19  0541   WBC 19.2* 23.5* 30.4*   RBC 3.20* 3.38* 3.33*   HGB 10.6* 10.9* 10.8*   HCT 33.4* 35.1* 34.9*   .4* 103.8* 104.8*   MCH 33.1 32.2 32.4   MCHC 31.7* 31.1* 30.9*   RDW 19.8* 18.3* 18.0*    243 291   MPV 12.6* 10.3 11.2       CBC with Differential:    Lab Results   Component Value Date    WBC 30.4 05/01/2019    RBC 3.33 05/01/2019    HGB 10.8 05/01/2019    HCT 34.9 05/01/2019     05/01/2019    .8 05/01/2019    MCH 32.4 05/01/2019    MCHC 30.9 05/01/2019    RDW 18.0 05/01/2019    NRBC 0.0 04/29/2019    LYMPHOPCT 3.5 04/29/2019    MONOPCT 7.9 04/29/2019    MYELOPCT 0.9 04/29/2019    BASOPCT 1.7 04/29/2019    MONOSABS 1.54 04/29/2019    LYMPHSABS 0.77 04/29/2019    EOSABS 0.00 04/29/2019    BASOSABS 0.33 04/29/2019     PTT:    Lab Results   Component Value Date    APTT 181.6 05/01/2019   [APTT}  Last 3 Troponin:    Lab Results   Component Value Date    TROPONINI <0.01 04/15/2019    TROPONINI <0.01 06/17/2015     HgBA1c:    Lab Results   Component Value Date    LABA1C 4.1 04/30/2019     FLP:  No results found for: TRIG, HDL, LDLCALC, LDLDIRECT, LABVLDL  TSH:    Lab Results   Component Value Date    TSH 2.550 12/06/2018        Radiology:   CTA CHEST W CONTRAST   Final Result   Significant amount of collapse of the left upper lobe with a rind of   pleural thickening, atelectasis and suspected tumor. The tumor appears   to encase the left upper lobe pulmonary artery and does encase some of   the left upper lobe bronchus. This appears unchanged. Moderate size right pleural effusion and small to moderate sized left   pleural effusion.       Areas of infiltrate in the right hemithorax with new area in the right   middle lobe and worsening right basilar atelectasis. Development of small acute embolus to a left lower lobe branch as well   as moderate sized embolus to the distal main -proximal right lower   lobe pulmonary artery branch and thrombus in the right middle lobe   branch      Large amount of upper abdominal ascites, unchanged               CRITICAL FINDING: Results were called and read back to Dr. Marisol Hagan on   4/30/2019 at 1500 hours      XR CHEST PORTABLE   Final Result   ALERT:  THIS IS AN ABNORMAL REPORT   1. Suspected infiltrative mass/malignancy left hilar and suprahilar   region. 2. Multifocal airspace disease left upper lung and bilateral lung   bases could be reflective of atelectasis/scarring and/or   infiltrate/pneumonia with suspected small bilateral pleural effusions. 3. Underlying mild central pulmonary vascular congestion. XR FOOT RIGHT (MIN 3 VIEWS)   Final Result   Mild dorsal forefoot soft tissue swelling and degenerative changes of   the first MTP joint without other significant findings. Assessment:    Principal Problem:    Gangrene of toe of right foot (HCC)  Active Problems:    Lung mass    Sepsis ([address]    Infected abrasion of right foot    Acute pulmonary embolism (HCC)  Resolved Problems:    * No resolved hospital problems. *      Plan:  1. Sepsis due to gangrene of right great toe - amputation today in OR  2. Lung CA - some compression in lung noted. On tx already  3. Acute PE - on heparin drip until after surgery  4. Domonique Gallardo for surgery today  5.  Acute on chronic resp failure - requiring o2, wean as tolerated        Electronically signed by Emani Thompson MD on 5/1/2019 at 12:57 PM

## 2019-05-02 PROBLEM — E43 SEVERE PROTEIN-CALORIE MALNUTRITION (HCC): Chronic | Status: ACTIVE | Noted: 2019-01-01

## 2019-05-02 NOTE — PLAN OF CARE
Problem: Malnutrition  (NI-5.2)  Goal: Food and/or Nutrient Delivery  Continue Diet. Start [facility] ONS BID. Pt declines Ensure at this time. Description  Individualized approach for food/nutrient provision.   Outcome: Not Met This Shift

## 2019-05-02 NOTE — PATIENT CARE CONFERENCE
[facility] Quality Flow/Interdisciplinary Rounds Progress Note        Quality Flow Rounds held on May 2, 2019    Disciplines Attending:  Bedside Nurse, ,  and Nursing Unit Leadership    Jennifer España was admitted on 4/29/2019  3:31 PM    Anticipated Discharge Date:  Expected Discharge Date: 05/03/19    Disposition:    Sven Score:  Sven Scale Score: 16    Readmission Risk              Risk of Unplanned Readmission:        16           Discussed patient goal for the day, patient clinical progression, and barriers to discharge.   The following Goal(s) of the Day/Commitment(s) have been identified:  Labs - Report Results      Nick Skinner  May 2, 2019

## 2019-05-02 NOTE — PROGRESS NOTES
branch and thrombus in the right middle lobe   branch      Large amount of upper abdominal ascites, unchanged               CRITICAL FINDING: Results were called and read back to Dr. Sabrina Johnson on   4/30/2019 at 1500 hours      XR CHEST PORTABLE   Final Result   ALERT:  THIS IS AN ABNORMAL REPORT   1. Suspected infiltrative mass/malignancy left hilar and suprahilar   region. 2. Multifocal airspace disease left upper lung and bilateral lung   bases could be reflective of atelectasis/scarring and/or   infiltrate/pneumonia with suspected small bilateral pleural effusions. 3. Underlying mild central pulmonary vascular congestion. XR FOOT RIGHT (MIN 3 VIEWS)   Final Result   Mild dorsal forefoot soft tissue swelling and degenerative changes of   the first MTP joint without other significant findings.                   Assessment:  1. Bilateral pulmonary emboli  2. Thrombophilia of malignancy  3. Necrotic toe  4. History of non-small cell carcinoma the lung  5. Abdominal fluid: Unclear etiology but it appears to surround the liver and is likely sympathetic effusion around the right lung.        Plan:  1. Stop heparin  2. Start of apixaban         Care reviewed with the staff and the patient's family as available. Please note that voice recognition technology was used in the preparation of this note and make therefore it may contain inadvertent transcription errors. Meggan Linton M.D., F.C.C.P.     [facility], [address] [place] [address], [address] [place] [address], [facility]

## 2019-05-02 NOTE — PLAN OF CARE
Problem: Pain:  Goal: Pain level will decrease  Description  Pain level will decrease  5/2/2019 1107 by Ghazala Tomas RN  Outcome: Met This Shift     Problem: Pain:  Goal: Control of acute pain  Description  Control of acute pain  5/2/2019 1107 by Ghazala Tomas RN  Outcome: Met This Shift     Problem: Pain:  Goal: Control of chronic pain  Description  Control of chronic pain  5/2/2019 1107 by Ghazala Tomas RN  Outcome: Met This Shift     Problem: Falls - Risk of:  Goal: Will remain free from falls  Description  Will remain free from falls  5/2/2019 1107 by Ghazala Tomas RN  Outcome: Met This Shift     Problem: Falls - Risk of:  Goal: Absence of physical injury  Description  Absence of physical injury  5/2/2019 1107 by Ghazala Tomas RN  Outcome: Met This Shift     Problem: Skin Integrity:  Goal: Will show no infection signs and symptoms  Description  Will show no infection signs and symptoms  5/2/2019 1107 by Ghazala Tomas RN  Outcome: Met This Shift     Problem: Skin Integrity:  Goal: Absence of new skin breakdown  Description  Absence of new skin breakdown  5/2/2019 1107 by Ghazala Tomas RN  Outcome: Met This Shift     Problem: Risk for Impaired Skin Integrity  Goal: Tissue integrity - skin and mucous membranes  Description  Structural intactness and normal physiological function of skin and  mucous membranes.   5/2/2019 1107 by Ghazala Tomas RN  Outcome: Met This Shift

## 2019-05-02 NOTE — PROGRESS NOTES
[address] [place] [address] [facility]  Progress Note    SUBJECTIVE:  Chief Complaint   Patient presents with    Leg Swelling    Wound Infection     [facility] and failure to thrive (lung cancer)     No new complaints today. No nausea, vomiting or diarrhea. No chest pain. No fever. Review of systems:  As stated above in the chief complaint, otherwise negative. Medications:  Scheduled Meds:   apixaban  10 mg Oral BID    vancomycin  1,250 mg Intravenous Q12H    aspirin  81 mg Oral Once per day on     atorvastatin  40 mg Oral Nightly    furosemide  40 mg Oral QAM    gabapentin  300 mg Oral Nightly    metoprolol tartrate  12.5 mg Oral BID    therapeutic multivitamin-minerals  1 tablet Oral Weekly    piperacillin-tazobactam  3.375 g Intravenous Q8H     Continuous Infusions:   sodium chloride 50 mL/hr at 19    sodium chloride 1,000 mL (19)     PRN Meds:heparin (porcine), heparin (porcine), sodium chloride flush, oxyCODONE HCl    OBJECTIVE:  /73   Pulse 108   Temp 97 °F (36.1 °C) (Oral)   Resp 18   Ht 6' (1.829 m)   Wt 179 lb 3.2 oz (81.3 kg)   SpO2 96%   BMI 24.30 kg/m²   Temp  Av.8 °F (36.6 °C)  Min: 97 °F (36.1 °C)  Max: 98.6 °F (37 °C)  Constitutional: The patient is lying in bed. Awake and alert. No distress. Skin: Warm and dry. HEENT: No jaundice. No ulcerations or thrush. Neck: Supple. Chest: Good breath sounds. No crackles. Cardiovascular: Rhythmic and regular. Abdomen: Positive bowel sounds to auscultation. Benign to palpation. Extremities: The right 1st toe is missing. The dressing shows minimal blood.   Lines: peripheral    Laboratory and Tests Review:  Lab Results   Component Value Date    WBC 25.8 (H) 2019    WBC 30.4 (H) 2019    WBC 23.5 (H) 2019    HGB 10.5 (L) 2019    HCT 34.0 (L) 2019    .7 (H) 2019     2019     Lab

## 2019-05-02 NOTE — PLAN OF CARE
Problem: Pain:  Goal: Pain level will decrease  Description  Pain level will decrease  Outcome: Met This Shift     Problem: Pain:  Goal: Control of acute pain  Description  Control of acute pain  Outcome: Met This Shift     Problem: Pain:  Goal: Control of chronic pain  Description  Control of chronic pain  Outcome: Met This Shift     Problem: Falls - Risk of:  Goal: Will remain free from falls  Description  Will remain free from falls  Outcome: Met This Shift     Problem: Falls - Risk of:  Goal: Absence of physical injury  Description  Absence of physical injury  Outcome: Met This Shift     Problem: Skin Integrity:  Goal: Will show no infection signs and symptoms  Description  Will show no infection signs and symptoms  Outcome: Met This Shift     Problem: Skin Integrity:  Goal: Absence of new skin breakdown  Description  Absence of new skin breakdown  Outcome: Met This Shift     Problem: Risk for Impaired Skin Integrity  Goal: Tissue integrity - skin and mucous membranes  Description  Structural intactness and normal physiological function of skin and  mucous membranes.   Outcome: Met This Shift

## 2019-05-02 NOTE — PROGRESS NOTES
area in the right   middle lobe and worsening right basilar atelectasis. Development of small acute embolus to a left lower lobe branch as well   as moderate sized embolus to the distal main -proximal right lower   lobe pulmonary artery branch and thrombus in the right middle lobe   branch      Large amount of upper abdominal ascites, unchanged               CRITICAL FINDING: Results were called and read back to Dr. Chrystal Sun on   4/30/2019 at 1500 hours      XR CHEST PORTABLE   Final Result   ALERT:  THIS IS AN ABNORMAL REPORT   1. Suspected infiltrative mass/malignancy left hilar and suprahilar   region. 2. Multifocal airspace disease left upper lung and bilateral lung   bases could be reflective of atelectasis/scarring and/or   infiltrate/pneumonia with suspected small bilateral pleural effusions. 3. Underlying mild central pulmonary vascular congestion. XR FOOT RIGHT (MIN 3 VIEWS)   Final Result   Mild dorsal forefoot soft tissue swelling and degenerative changes of   the first MTP joint without other significant findings. Assessment:    Principal Problem:    Gangrene of toe of right foot (HCC)  Active Problems:    Lung mass    Sepsis ([address]    Infected abrasion of right foot    Acute pulmonary embolism (HCC)  Resolved Problems:    * No resolved hospital problems. *      Plan:  1. Sepsis due to gangrene of right great toe - amputation done on 5/1 and tolerated  2. Non-small cell Lung CA - some compression in lung noted. On tx already  3. Acute PE - on heparin drip until after surgery  4. Harmelissa Sat for surgery today  5.  Acute on chronic resp failure - requiring o2, wean as tolerated        Electronically signed by Jose Raul Davidson MD on 5/2/2019 at 1:32 PM

## 2019-05-02 NOTE — PROGRESS NOTES
Nutrition Assessment    Type and Reason for Visit: Initial, Positive Nutrition Screen, Consult(Wt Loss, Poor Intake, Wounds)    Nutrition Recommendations: Continue Diet. Start Arturo Wound Healing ONS BID. Pt declines Ensure at this time. Nutrition Assessment: Pt severely malnourished AEB moderate muscle and severe fat loss, poor intake and stated wt loss d/t poor appetite per pt and son at bedside. At further risk d/t metabolic demand for wound healing s/p Rt Toe Amputation and h/o Lung CA w/ bone mets and chemo. Malnutrition Assessment:  · Malnutrition Status: Meets the criteria for severe malnutrition  · Context: Chronic illness  · Findings of the 6 clinical characteristics of malnutrition (Minimum of 2 out of 6 clinical characteristics is required to make the diagnosis of moderate or severe Protein Calorie Malnutrition based on AND/ASPEN Guidelines):  1. Energy Intake-Less than or equal to 75% of estimated energy requirement, Greater than or equal to 3 months    2. Weight Loss-Unable to assess, in 1 year  3. Fat Loss-Moderate subcutaneous fat loss, Orbital, Triceps  4. Muscle Loss-Severe muscle mass loss, Thigh (quadriceps), Calf (gastrocnemius)  5. Fluid Accumulation-Mild fluid accumulation, Extremities  6.   Strength-Not measured    Nutrition Risk Level: High    Nutrient Needs:  · Estimated Daily Total Kcal: 9594-6140(25-30 kcals/kg CBW)  · Estimated Daily Protein (g): 120-145(1.5-1.8 gm/kg CBW)  · Estimated Daily Total Fluid (ml/day): 6138-1421(1 ml/kcal)    Nutrition Diagnosis:   · Problem: Severe malnutrition, In context of chronic illness  · Etiology: related to Catabolic illness     Signs and symptoms:  as evidenced by Intake 50-75%, Diet history of poor intake, Presence of wounds, Weight loss, Moderate loss of subcutaneous fat, Severe muscle loss, Localized or generalized fluid accumulation    Objective Information:  · Nutrition-Focused Physical Findings: A&O, +teeth, Abd/BS WDL, +1/+3 BLE edema, +I/O's  · Wound Type: Surgical Wound, Open Wounds  · Current Nutrition Therapies:  · Oral Diet Orders: General   · Oral Diet intake: 51-75%  · Oral Nutrition Supplement (ONS) Orders: None  · ONS intake: (none)  · Anthropometric Measures:  · Ht: 6' (182.9 cm)   · Current Body Wt: 179 lb (81.2 kg)(bed 5/2)  · Admission Body Wt: 169 lb (76.7 kg)(bed 4/30)  · Usual Body Wt: 190 lb (86.2 kg)(per pt, poor EMR hx)  · % Weight Change:  unable to properly assess at this time d/t significant fluid gains per pt and per EMR (BLE edema and +I/O's at this time); however pt states wt loss from # down to ~150# x 1 yr pta d/t Cancer/Chemo    · Ideal Body Wt: 178 lb (80.7 kg), % Ideal Body 101%  · BMI Classification: BMI 18.5 - 24.9 Normal Weight    Nutrition Interventions:   Continue current diet, Start ONS(Continue Diet. Start Arturo Wound Healing ONS BID.   Pt declines Ensure at this time.  )  Continued Inpatient Monitoring, Education Initiated, Coordination of Care(ONS options/benefits)    Nutrition Evaluation:   · Evaluation: Goals set   · Goals: PO intake >75% of meals/ONS    · Monitoring: Meal Intake, Supplement Intake, Diet Tolerance, Skin Integrity, Wound Healing, I&O, Weight, Pertinent Labs, Monitor Hemodynamic Status, Monitor Bowel Function      Electronically signed by Atul Castle RD, LD on 5/2/19 at 4:15 PM    Contact Number: ext [phone]

## 2019-05-03 NOTE — FLOWSHEET NOTE
Inpatient Wound Care    Admit Date: 4/29/2019  3:31 PM    Reason for consult:  Coccyx    Significant history:  Admitted with gangrene of R toe s/p amp. History includes: lung cancer on chemo, CAD, COPD, HTN , HLD. Wound history:  POA    Findings:       05/03/19 1145   Wound 04/30/19 Coccyx   Date First Assessed/Time First Assessed: 04/30/19 1336   Present on Hospital Admission: Yes  Location: Coccyx   Wound Pressure Stage  2   Dressing Status Intact   Dressing/Treatment   (mepilex sacral)   Dressing Change Due 05/06/19   Wound Length (cm) 1.4 cm   Wound Width (cm) 1 cm   Wound Depth (cm) 0.2 cm   Wound Surface Area (cm^2) 1.4 cm^2   Change in Wound Size % (l*w) -180   Wound Volume (cm^3) 0.28 cm^3   Wound Healing % -180   Wound Assessment Red   Drainage Amount None   Odor None   Lara-wound Assessment Red       Impression:  Stage 2 pressure injury on coccyx with surrounding erythema. Interventions in place:  Mepilex sacral intact to coccyx. Pt is able to reposition in bed. States the nurses turn him. Plan: Mepilex to sacrum, SOS precautions.        Dc Pedroza 5/3/2019 11:46 AM

## 2019-05-03 NOTE — CARE COORDINATION
Informed [place] nursing staff of potential patient discharge and new script for Eliquis. Met with patient and presented him with both  a 30 day free supply coupon and a coupon for co-pay reduction for additional prescription refills. Patient voiced understanding and was thankful for the information. Reina Romo.  Robbie, MSN, RN  [facility] Case Management  [phone]

## 2019-05-03 NOTE — PROGRESS NOTES
[facility]   Progress Note    Admitting Date and Time: 4/29/2019  3:31 PM  Admit Dx: Sepsis ([facility] [A41.9]  Sepsis ([facility] [address] [A41.9]  Infected abrasion of right foot, initial encounter [K88.560P, L08.9]    Subjective: Came with Infected toe, also had CP-showed PE, did undergo surgery on toe, anticoagulation changed to Eliquis from Pulmonary side, remains on Zosyn and Vancomycin from ID, pending c/s    Patient was admitted with Sepsis ([facility] [address] [A41.9]  Sepsis ([facility] [address] [A41.9]  Infected abrasion of right foot, initial encounter [S90.811A, L08.9]. Patient feels much better. No new complains. Per RN: No overnight issues. ROS: denies fever, chills, cp, sob, n/v, HA unless stated above.      apixaban  10 mg Oral BID    vancomycin  1,250 mg Intravenous Q12H    aspirin  81 mg Oral Once per day on Mon Thu    atorvastatin  40 mg Oral Nightly    furosemide  40 mg Oral QAM    gabapentin  300 mg Oral Nightly    metoprolol tartrate  12.5 mg Oral BID    therapeutic multivitamin-minerals  1 tablet Oral Weekly    piperacillin-tazobactam  3.375 g Intravenous Q8H       sodium chloride flush 10 mL PRN   oxyCODONE HCl 10 mg Q4H PRN        Objective:    BP (!) 130/90   Pulse 114   Temp 98.9 °F (37.2 °C) (Oral)   Resp 16   Ht 6' (1.829 m)   Wt 171 lb 4.8 oz (77.7 kg)   SpO2 94%   BMI 23.23 kg/m²   General Appearance: alert and oriented to person, place and time, well-developed and well-nourished, in no acute distress  Skin: warm and dry, no rash or erythema  Head: normocephalic and atraumatic  Eyes: pupils equal, round, and reactive to light, extraocular eye movements intact, conjunctivae normal  ENT: tympanic membrane, external ear and ear canal normal bilaterally, oropharynx clear and moist with normal mucous membranes  Neck: neck supple and non tender without mass, no thyromegaly or thyroid nodules, no cervical lymphadenopathy   Pulmonary/Chest: clear to auscultation bilaterally- no wheezes, rales or rhonchi, normal air movement, no respiratory distress  Cardiovascular: normal rate, normal S1 and S2, no gallops, intact distal pulses and no carotid bruits  Abdomen: soft, non-tender, non-distended, normal bowel sounds, no masses or organomegaly      Recent Labs     05/01/19 0541 05/02/19 0429 05/03/19  0330    132 131*   K 3.0* 3.1* 2.9*   CL 96* 95* 94*   CO2 29 26 26   BUN 9 8 9   CREATININE 0.9 0.8 0.9   GLUCOSE 106* 137* 90   CALCIUM 6.7* 6.6* 7.0*       Recent Labs     05/01/19 0541 05/02/19 0429 05/03/19  0330   WBC 30.4* 25.8* 30.4*   RBC 3.33* 3.28* 3.53*   HGB 10.8* 10.5* 11.5*   HCT 34.9* 34.0* 36.7*   .8* 103.7* 104.0*   MCH 32.4 32.0 32.6   MCHC 30.9* 30.9* 31.3*   RDW 18.0* 17.4* 17.4*    237 303   MPV 11.2 10.8 11.4       k 2.9    Radiology:   CTA CHEST W CONTRAST   Final Result   Significant amount of collapse of the left upper lobe with a rind of   pleural thickening, atelectasis and suspected tumor. The tumor appears   to encase the left upper lobe pulmonary artery and does encase some of   the left upper lobe bronchus. This appears unchanged. Moderate size right pleural effusion and small to moderate sized left   pleural effusion. Areas of infiltrate in the right hemithorax with new area in the right   middle lobe and worsening right basilar atelectasis. Development of small acute embolus to a left lower lobe branch as well   as moderate sized embolus to the distal main -proximal right lower   lobe pulmonary artery branch and thrombus in the right middle lobe   branch      Large amount of upper abdominal ascites, unchanged               CRITICAL FINDING: Results were called and read back to Dr. Chrystal Sun on   4/30/2019 at 1500 hours      XR CHEST PORTABLE   Final Result   ALERT:  THIS IS AN ABNORMAL REPORT   1. Suspected infiltrative mass/malignancy left hilar and suprahilar   region.    2. Multifocal airspace disease left upper lung and bilateral lung

## 2019-05-03 NOTE — PROGRESS NOTES
is oriented person, place  and time    PHYSICAL EXAMINATION FOCUSED LOWER EXTREMITY    DERMATOLOGY EXAMINATION:          Surgical site of the right foot stable great toe with sutures intact. Surgical site margins remain viable. No erythema noted right foot. Edema improved right lower extremity. Assessment:   1. Status Post Surgery to Right foot: Partial amputation right great toe      Patient Active Problem List   Diagnosis Code    Lung mass R91.8    Hyperlipidemia E78.5    MI (mitral incompetence) I34.0    Hypertension I10    Metastasis (HCC) C79.9    CAD (coronary artery disease) I25.10    Pleural effusion J90    Sepsis ([address] A41.9    Infected abrasion of right foot S90.811A, L08.9    Acute pulmonary embolism (HCC) I26.99    Gangrene of toe of right foot ([address] I96    Severe protein-calorie malnutrition ([address] E43         Plan:   1. Performed Evaluation and Management  2. Assessed surgical site progress, healing without issues. Area redressed  3. Continue current dressing orders. Per Podiatry standpoint patient stable for discharge. 4. Continue offloading protocol.   5. Will follow upon discharge.      @ Electronically signed by Dona Messina DPM on 5/3/2019 at 7:27 AM

## 2019-05-03 NOTE — PROGRESS NOTES
Pulmonary Progress Note    Admit Date: 2019  Hospital day                               PCP: No primary care provider on file. Chief Complaint (s):  Patient Active Problem List   Diagnosis    Lung mass    Hyperlipidemia    MI (mitral incompetence)    Hypertension    Metastasis ([facility]    CAD (coronary artery disease)    Pleural effusion    Sepsis ([facility]    Infected abrasion of right foot    Acute pulmonary embolism (HCC)    Gangrene of toe of right foot (HCC)    Severe protein-calorie malnutrition (HCC)       Subjective:  · Tolerating apixaban well. No bleeding complication. Vitals:  VITALS:  /69   Pulse 104   Temp 98.5 °F (36.9 °C) (Oral)   Resp 14   Ht 6' (1.829 m)   Wt 171 lb 4.8 oz (77.7 kg)   SpO2 98%   BMI 23.23 kg/m²     24HR INTAKE/OUTPUT:      Intake/Output Summary (Last 24 hours) at 5/3/2019 1205  Last data filed at 5/3/2019 0831  Gross per 24 hour   Intake 1535 ml   Output 1350 ml   Net 185 ml       24HR PULSE OXIMETRY RANGE:    SpO2  Av.7 %  Min: 94 %  Max: 98 %    Medications:  IV:   sodium chloride 50 mL/hr at 19 2020    sodium chloride 1,000 mL (19 1742)       Scheduled Meds:   apixaban  10 mg Oral BID    vancomycin  1,250 mg Intravenous Q12H    aspirin  81 mg Oral Once per day on Mon Thu    atorvastatin  40 mg Oral Nightly    furosemide  40 mg Oral QAM    gabapentin  300 mg Oral Nightly    metoprolol tartrate  12.5 mg Oral BID    therapeutic multivitamin-minerals  1 tablet Oral Weekly    piperacillin-tazobactam  3.375 g Intravenous Q8H       Diet:   DIET GENERAL;  Dietary Nutrition Supplements: Wound Healing Oral Supplement     EXAM:  General: No distress. Alert. Eyes: PERRL. No sclera icterus. No conjunctival injection. ENT: No discharge. Pharynx clear. Neck: Trachea midline. Normal thyroid. Resp: No accessory muscle use. No rales. No wheezing. No rhonchi. CV: Regular rate. Regular rhythm. No murmur or rub.    Abd: Non-tender. Non-distended. No masses. No organomegaly. Normal bowel sounds. Skin: Warm and dry. No nodule on exposed extremities. No rash on exposed extremities. Ext: No cyanosis, clubbing, edema  Lymph: No cervical LAD. No supraclavicular LAD. M/S: No cyanosis. No joint deformity. No clubbing. Neuro: Awake. Follows commands. Positive pupils/gag/corneals. Normal pain response. Results:  CBC:   Recent Labs     05/01/19 0541 05/02/19 0429 05/03/19 0330   WBC 30.4* 25.8* 30.4*   HGB 10.8* 10.5* 11.5*   HCT 34.9* 34.0* 36.7*   .8* 103.7* 104.0*    237 303     BMP:   Recent Labs     05/01/19 0541 05/02/19 0429 05/03/19 0330    132 131*   K 3.0* 3.1* 2.9*   CL 96* 95* 94*   CO2 29 26 26   BUN 9 8 9   CREATININE 0.9 0.8 0.9     LIVER PROFILE:   Recent Labs     05/01/19 0541   AST 47*   ALT 29   BILITOT 1.5*   ALKPHOS 415*     PT/INR: No results for input(s): PROTIME, INR in the last 72 hours. APTT:   Recent Labs     05/01/19 0722 05/01/19 2015 05/02/19 0429   APTT 181.6* 49.3* 120.0*         Pathology:  1. N/A      Microbiology:  1. None    Recent ABG:   No results for input(s): PH, PO2, PCO2, HCO3, BE, O2SAT, METHB, O2HB, COHB, O2CON, HHB, THB in the last 72 hours. Recent Films:  CTA CHEST W CONTRAST   Final Result   Significant amount of collapse of the left upper lobe with a rind of   pleural thickening, atelectasis and suspected tumor. The tumor appears   to encase the left upper lobe pulmonary artery and does encase some of   the left upper lobe bronchus. This appears unchanged. Moderate size right pleural effusion and small to moderate sized left   pleural effusion. Areas of infiltrate in the right hemithorax with new area in the right   middle lobe and worsening right basilar atelectasis.       Development of small acute embolus to a left lower lobe branch as well   as moderate sized embolus to the distal main -proximal right lower   lobe pulmonary artery

## 2019-05-03 NOTE — PROGRESS NOTES
[address] [place] [address] [facility]  Progress Note    SUBJECTIVE:  Chief Complaint   Patient presents with    Leg Swelling    Wound Infection     necrotic toe-sent from Dr Serjio Wang office for pain control and failure to thrive (lung cancer)     Patient feels well. No new complaints. Remains afebrile. No nausea or vomiting. Tolerating antibiotics well. Review of systems:  As stated above in the chief complaint, otherwise negative. Medications:  Scheduled Meds:   apixaban  10 mg Oral BID    vancomycin  1,250 mg Intravenous Q12H    aspirin  81 mg Oral Once per day on u    atorvastatin  40 mg Oral Nightly    furosemide  40 mg Oral QAM    gabapentin  300 mg Oral Nightly    metoprolol tartrate  12.5 mg Oral BID    therapeutic multivitamin-minerals  1 tablet Oral Weekly    piperacillin-tazobactam  3.375 g Intravenous Q8H     Continuous Infusions:   sodium chloride 50 mL/hr at 19    sodium chloride 1,000 mL (19 1742)     PRN Meds:sodium chloride flush, oxyCODONE HCl    OBJECTIVE:  /69   Pulse 104   Temp 98.5 °F (36.9 °C) (Oral)   Resp 14   Ht 6' (1.829 m)   Wt 171 lb 4.8 oz (77.7 kg)   SpO2 98%   BMI 23.23 kg/m²   Temp  Av.2 °F (36.8 °C)  Min: 97.6 °F (36.4 °C)  Max: 98.9 °F (37.2 °C)  Constitutional: the patient is lying in bed. He is in no distress. Skin: Warm and dry. HEENT: No jaundice. No ulcerations or thrush. Neck: Supple. Chest: Good breath sounds. No crackles. Cardiovascular: Rhythmic and regular. Abdomen: Positive bowel sounds to auscultation. Benign to palpation. Extremities: The right 1st toe is missing. The dressing shows minimal blood. The amputated site looks dark.   Lines: peripheral    Laboratory and Tests Review:  Lab Results   Component Value Date    WBC 30.4 (H) 2019    WBC 25.8 (H) 2019    WBC 30.4 (H) 2019    HGB 11.5 (L) 2019    HCT 36.7 (L) 2019    .0 (H) 2019     05/03/2019     Lab Results   Component Value Date    NEUTROABS 16.70 (H) 04/29/2019    NEUTROABS 16.55 (H) 04/29/2019    NEUTROABS 2.48 04/15/2019     Lab Results   Component Value Date    CRP 9.4 (H) 04/30/2019    CRP 10.0 (H) 04/29/2019     No results found for: CRPHS  Lab Results   Component Value Date    SEDRATE 2 04/30/2019    SEDRATE 0 04/29/2019     Lab Results   Component Value Date    ALT 29 05/01/2019    AST 47 (H) 05/01/2019    ALKPHOS 415 (H) 05/01/2019    BILITOT 1.5 (H) 05/01/2019     Lab Results   Component Value Date     05/03/2019    K 2.9 05/03/2019    K 3.2 04/15/2019    CL 94 05/03/2019    CO2 26 05/03/2019    BUN 9 05/03/2019    CREATININE 0.9 05/03/2019    CREATININE 0.8 05/02/2019    CREATININE 0.9 05/01/2019    GFRAA >60 05/03/2019    LABGLOM >60 05/03/2019    GLUCOSE 90 05/03/2019    PROT 3.8 05/01/2019    LABALBU 1.8 05/01/2019    CALCIUM 7.0 05/03/2019    BILITOT 1.5 05/01/2019    ALKPHOS 415 05/01/2019    AST 47 05/01/2019    ALT 29 05/01/2019     Radiology:      Microbiology:   Blood cultures 4/29/19 negative so far  Toe wound culture coagulase negative Staphylococcus    ASSESSMENT:  · Gangrene of the right 1st toe. Status post partial amputation on 5/1/19  · Possible acute osteomyelitis of the right hallux  · Lung cancer, currently undergoing chemotherapy. Patient is unsure if he received filgrastim.  WBC seems to have peaked and now seems to be coming down  · Leukocytosis associated to the above versus filgrastim  · Pulmonary embolism    PLAN:  · Change Vancomycin and Zosyn over to Doxycycline  · Check cultures    Allyssa Chino  1:17 PM  5/3/2019

## 2019-05-03 NOTE — PATIENT CARE CONFERENCE
[facility] Quality Flow/Interdisciplinary Rounds Progress Note        Quality Flow Rounds held on May 3, 2019    Disciplines Attending:  Bedside Nurse, ,  and Nursing Unit Leadership    Saima Ortiz was admitted on 4/29/2019  3:31 PM    Anticipated Discharge Date:  Expected Discharge Date: 05/03/19    Disposition:    Sven Score:  Sven Scale Score: 15    Readmission Risk              Risk of Unplanned Readmission:        16           Discussed patient goal for the day, patient clinical progression, and barriers to discharge.   The following Goal(s) of the Day/Commitment(s) have been identified:  Labs - Report Results      Starr Kobs  May 3, 2019

## 2019-05-04 NOTE — PROGRESS NOTES
Pulmonary Progress Note    Admit Date: 2019  Hospital day                               PCP: No primary care provider on file. Chief Complaint (s):  Patient Active Problem List   Diagnosis    Lung mass    Hyperlipidemia    MI (mitral incompetence)    Hypertension    [facility]astasis ([facility]    CAD (coronary artery disease)    Pleural effusion    Sepsis ([facility]    Infected abrasion of right foot    Acute pulmonary embolism (HCC)    Gangrene of toe of right foot (HCC)    Severe protein-calorie malnutrition (HCC)       Subjective:  · Complaining of lower extremity edema. Apixaban is well-tolerated. Vitals:  VITALS:  /82   Pulse 121 Comment: aftering going to restroom  Temp 96.4 °F (35.8 °C) (Oral)   Resp 16   Ht 6' (1.829 m)   Wt 174 lb 9.6 oz (79.2 kg)   SpO2 97%   BMI 23.68 kg/m²     24HR INTAKE/OUTPUT:      Intake/Output Summary (Last 24 hours) at 2019 1319  Last data filed at 2019 0449  Gross per 24 hour   Intake 2587 ml   Output 275 ml   Net 2312 ml       24HR PULSE OXIMETRY RANGE:    SpO2  Av %  Min: 93 %  Max: 97 %    Medications:  IV:      Scheduled Meds:   docusate sodium  100 mg Oral Daily    doxycycline hyclate  100 mg Oral 2 times per day    apixaban  10 mg Oral BID    aspirin  81 mg Oral Once per day on u    atorvastatin  40 mg Oral Nightly    furosemide  40 mg Oral QAM    gabapentin  300 mg Oral Nightly    metoprolol tartrate  12.5 mg Oral BID    therapeutic multivitamin-minerals  1 tablet Oral Weekly       Diet:   DIET GENERAL;  Dietary Nutrition Supplements: Wound Healing Oral Supplement     EXAM:  General: No distress. Alert. Eyes: PERRL. No sclera icterus. No conjunctival injection. ENT: No discharge. Pharynx clear. Neck: Trachea midline. Normal thyroid. Resp: No accessory muscle use. No rales. No wheezing. No rhonchi. CV: Regular rate. Regular rhythm. No murmur or rub. Abd: Non-tender. Non-distended. No masses.  No organomegaly. Normal bowel sounds. Skin: Warm and dry. No nodule on exposed extremities. No rash on exposed extremities. Ext: No cyanosis, clubbing, edema  Lymph: No cervical LAD. No supraclavicular LAD. M/S: No cyanosis. No joint deformity. No clubbing. Neuro: Awake. Follows commands. Positive pupils/gag/corneals. Normal pain response. Results:  CBC:   Recent Labs     05/02/19 0429 05/03/19 0330 05/04/19 0430   WBC 25.8* 30.4* 28.2*   HGB 10.5* 11.5* 11.2*   HCT 34.0* 36.7* 36.6*   .7* 104.0* 104.0*    303 279     BMP:   Recent Labs     05/02/19 0429 05/03/19 0330 05/04/19 0430    131* 135   K 3.1* 2.9* 3.4*   CL 95* 94* 97*   CO2 26 26 28   BUN 8 9 10   CREATININE 0.8 0.9 1.0     LIVER PROFILE:   No results for input(s): AST, ALT, LIPASE, BILIDIR, BILITOT, ALKPHOS in the last 72 hours. Invalid input(s): AMYLASE,  ALB  PT/INR: No results for input(s): PROTIME, INR in the last 72 hours. APTT:   Recent Labs     05/01/19 2015 05/02/19 0429   APTT 49.3* 120.0*         Pathology:  1. N/A      Microbiology:  1. None    Recent ABG:   No results for input(s): PH, PO2, PCO2, HCO3, BE, O2SAT, METHB, O2HB, COHB, O2CON, HHB, THB in the last 72 hours. Recent Films:  CTA CHEST W CONTRAST   Final Result   Significant amount of collapse of the left upper lobe with a rind of   pleural thickening, atelectasis and suspected tumor. The tumor appears   to encase the left upper lobe pulmonary artery and does encase some of   the left upper lobe bronchus. This appears unchanged. Moderate size right pleural effusion and small to moderate sized left   pleural effusion. Areas of infiltrate in the right hemithorax with new area in the right   middle lobe and worsening right basilar atelectasis.       Development of small acute embolus to a left lower lobe branch as well   as moderate sized embolus to the distal main -proximal right lower   lobe pulmonary artery branch and thrombus in the right middle lobe   branch      Large amount of upper abdominal ascites, unchanged               CRITICAL FINDING: Results were called and read back to Dr. Shaun Donaldson on   4/30/2019 at 1500 hours      XR CHEST PORTABLE   Final Result   ALERT:  THIS IS AN ABNORMAL REPORT   1. Suspected infiltrative mass/malignancy left hilar and suprahilar   region. 2. Multifocal airspace disease left upper lung and bilateral lung   bases could be reflective of atelectasis/scarring and/or   infiltrate/pneumonia with suspected small bilateral pleural effusions. 3. Underlying mild central pulmonary vascular congestion. XR FOOT RIGHT (MIN 3 VIEWS)   Final Result   Mild dorsal forefoot soft tissue swelling and degenerative changes of   the first MTP joint without other significant findings.                   Assessment:  1. Bilateral pulmonary emboli  2. Thrombophilia of malignancy  3. Necrotic toe  4. History of non-small cell carcinoma the lung  5. Abdominal fluid: Unclear etiology but it appears to surround the liver and is likely sympathetic effusion around the right lung.        Plan:  1. Continue Eliquis  2. RONDA hose  3. Stop fluids         Care reviewed with the staff and the patient's family as available. Please note that voice recognition technology was used in the preparation of this note and make therefore it may contain inadvertent transcription errors. Martin Grenee M.D., F.C.C.P.     [facility], [address] [place] [address], [address] [place] [address]

## 2019-05-04 NOTE — PROGRESS NOTES
[facility]div[facility] Hospitalist   Progress Note    Admitting Date and Time: 4/29/2019  3:31 PM  Admit Dx: Sepsis ([facility] [A41.9]  Sepsis ([facility] [A41.9]  Infected abrasion of right foot, initial encounter [P62.397B, L08.9]    Subjective: Came with Infected toe, also had CP-showed PE, did undergo surgery on toe, anticoagulation changed to Eliquis from Pulmonary side, remains on Zosyn and Vancomycin from ID, pending c/s  Abx changed to Doxycycline      Patient was admitted with Sepsis ([facility] [address] [facility] [A41.9]  Sepsis ([facility] [address] [facility] [A41.9]  Infected abrasion of right foot, initial encounter [S90.811A, L08.9]. Patient feels much better. Do complain of scrotal swelling / per patient doubled in size in last few days. Per RN: No overnight issues. ROS: denies fever, chills, cp, sob, n/v, HA unless stated above.      doxycycline hyclate  100 mg Oral 2 times per day    apixaban  10 mg Oral BID    aspirin  81 mg Oral Once per day on Mon Thu    atorvastatin  40 mg Oral Nightly    furosemide  40 mg Oral QAM    gabapentin  300 mg Oral Nightly    metoprolol tartrate  12.5 mg Oral BID    therapeutic multivitamin-minerals  1 tablet Oral Weekly       sodium chloride flush 10 mL PRN   oxyCODONE HCl 10 mg Q4H PRN        Objective:    /82   Pulse 121 Comment: aftering going to restroom  Temp 96.4 °F (35.8 °C) (Oral)   Resp 16   Ht 6' (1.829 m)   Wt 174 lb 9.6 oz (79.2 kg)   SpO2 97%   BMI 23.68 kg/m²   General Appearance: alert and oriented to person, place and time, well-developed and well-nourished, in no acute distress  Skin: warm and dry, no rash or erythema  Head: normocephalic and atraumatic  Eyes: pupils equal, round, and reactive to light, extraocular eye movements intact, conjunctivae normal  ENT: tympanic membrane, external ear and ear canal normal bilaterally, oropharynx clear and moist with normal mucous membranes  Neck: neck supple and non tender without mass, no thyromegaly or thyroid nodules, no cervical Suspected infiltrative mass/malignancy left hilar and suprahilar   region. 2. Multifocal airspace disease left upper lung and bilateral lung   bases could be reflective of atelectasis/scarring and/or   infiltrate/pneumonia with suspected small bilateral pleural effusions. 3. Underlying mild central pulmonary vascular congestion. XR FOOT RIGHT (MIN 3 VIEWS)   Final Result   Mild dorsal forefoot soft tissue swelling and degenerative changes of   the first MTP joint without other significant findings. Assessment:    Principal Problem:    Gangrene of toe of right foot (HCC)  Active Problems:    Lung mass    Sepsis ([address]    Infected abrasion of right foot    Acute pulmonary embolism (HCC)    Severe protein-calorie malnutrition (HCC)  Resolved Problems:    * No resolved hospital problems. *      Plan:  1. Gangrenous right 1st toe / now has Partial amputation / podiatry following / out patient follow up  2. Likely Osteomyelitis / Doxy / ID to decide duration  3. Acute PE / Patient feeling much better / on Eliquis. 4. Hypokalemia / supplement  5. Ca Lung -On treatment. 6. Respiratory Failure / on supplemental O2  7. DC planning, later today or tomorrow  8. PT evaluation requested  9.  Scrotal support        Electronically signed by Chacorta Hernandez MD on 5/4/2019 at 12:13 PM

## 2019-05-04 NOTE — PLAN OF CARE
Problem: Pain:  Goal: Control of acute pain  Description  Control of acute pain  Outcome: Met This Shift     Problem: Falls - Risk of:  Goal: Will remain free from falls  Description  Will remain free from falls  Outcome: Met This Shift

## 2019-05-04 NOTE — PROGRESS NOTES
[address] [place] [address] [facility]  Progress Note    SUBJECTIVE:  Chief Complaint   Patient presents with    Leg Swelling    Wound Infection     necrotic toe-sent from Dr Angelita Elise office for pain control and failure to thrive (lung cancer)     Patient feels well. No new complaints. Remains afebrile. No nausea or vomiting. Tolerating antibiotics well. Some pain right foot    Review of systems:  As stated above in the chief complaint, otherwise negative. Medications:  Scheduled Meds:   doxycycline hyclate  100 mg Oral 2 times per day    apixaban  10 mg Oral BID    aspirin  81 mg Oral Once per day on     atorvastatin  40 mg Oral Nightly    furosemide  40 mg Oral QAM    gabapentin  300 mg Oral Nightly    metoprolol tartrate  12.5 mg Oral BID    therapeutic multivitamin-minerals  1 tablet Oral Weekly     Continuous Infusions:   sodium chloride 50 mL/hr at 19 2020    sodium chloride 1,000 mL (19 1700)     PRN Meds:sodium chloride flush, oxyCODONE HCl    OBJECTIVE:  /82   Pulse 121   Temp 97.3 °F (36.3 °C) (Oral)   Resp 16   Ht 6' (1.829 m)   Wt 174 lb 9.6 oz (79.2 kg)   SpO2 97%   BMI 23.68 kg/m²   Temp  Av.7 °F (36.5 °C)  Min: 97.3 °F (36.3 °C)  Max: 98.5 °F (36.9 °C)  Constitutional: the patient is lying in bed. He is in no distress. Skin: Warm and dry. HEENT: No jaundice. No ulcerations or thrush. Neck: Supple. Chest: Good breath sounds. No crackles. Cardiovascular: Rhythmic and regular. Abdomen: Positive bowel sounds to auscultation. Benign to palpation. Extremities: The right 1st toe incision well approximated, no active d/c. Ecchymoses over dorsal foot. Dorsalis pedis pulse 0/4. Discoloration proximal dorsal foot/ankle deep tissue injury from compression.  +2-3 edema b/l LEs  Lines: peripheral    Laboratory and Tests Review:  Lab Results   Component Value Date    WBC 28.2 (H) 2019    WBC 30.4 (H) 2019    WBC 25.8 (H) 05/02/2019    HGB 11.2 (L) 05/04/2019    HCT 36.6 (L) 05/04/2019    .0 (H) 05/04/2019     05/04/2019     Lab Results   Component Value Date    NEUTROABS 16.70 (H) 04/29/2019    NEUTROABS 16.55 (H) 04/29/2019    NEUTROABS 2.48 04/15/2019     Lab Results   Component Value Date    CRP 9.4 (H) 04/30/2019    CRP 10.0 (H) 04/29/2019     No results found for: CRPHS  Lab Results   Component Value Date    SEDRATE 2 04/30/2019    SEDRATE 0 04/29/2019     Lab Results   Component Value Date    ALT 29 05/01/2019    AST 47 (H) 05/01/2019    ALKPHOS 415 (H) 05/01/2019    BILITOT 1.5 (H) 05/01/2019     Lab Results   Component Value Date     05/04/2019    K 3.4 05/04/2019    K 3.2 04/15/2019    CL 97 05/04/2019    CO2 28 05/04/2019    BUN 10 05/04/2019    CREATININE 1.0 05/04/2019    CREATININE 0.9 05/03/2019    CREATININE 0.8 05/02/2019    GFRAA >60 05/04/2019    LABGLOM >60 05/04/2019    GLUCOSE 106 05/04/2019    PROT 3.8 05/01/2019    LABALBU 1.8 05/01/2019    CALCIUM 7.2 05/04/2019    BILITOT 1.5 05/01/2019    ALKPHOS 415 05/01/2019    AST 47 05/01/2019    ALT 29 05/01/2019     Radiology:      Microbiology:   Blood cultures 4/29/19 negative so far  Toe wound culture coagulase negative Staphylococcus    ASSESSMENT:  · Gangrene of the right 1st toe. Status post partial amputation on 5/1/19  · Possible acute osteomyelitis of the right hallux  · Lung cancer, currently undergoing chemotherapy. Patient is unsure if he received filgrastim. WBC seems to have peaked and now seems to be coming down  · Leukocytosis associated to the above versus filgrastim  · Pulmonary embolism    PLAN:  · Continue  Doxycycline. Reconciled ×10 days  · D/w nursing - no reapplication of coban to right foot for now    [facility] [address] [facility]  10:52 AM  5/4/2019     Patient seen and examined. I had a face to face encounter with the patient. Agree with exam, assessment and plan as outlined above.  Addition and corrections were done as deemed appropriate. My exam and plan include: Patient is tolerating Doxycycline well. The antibiotic has been reconciled. Patient can be discharged. He will be followed by podiatry.  We'll be seeing him on an as-needed basis    Josselin Millard  5/4/2019

## 2019-05-05 NOTE — PROGRESS NOTES
Patient is agreeable to Katty Birch with [facility]. Orders placed and Annye Seeds with [facility] is aware.     Electronically signed by Dawn George RN on 5/5/2019 at 1:26 PM

## 2019-05-05 NOTE — DISCHARGE SUMMARY
[facility] EMERGENCY SERVICE Physician Discharge Summary       Charmayne Searles, DO  [address] [facility] [address]  [phone]          [address]   [place] [address]  [phone]          Activity level: as tolerated    Diet: DIET GENERAL;  Dietary Nutrition Supplements: Wound Healing Oral Supplement    Labs:    Dispo:home    Condition at discharge: stable    Continue supplemental oxygen via nasal canula @ 2 LPM round-the-clock. Continue CPAP / BiPAP during sleep as prior to admission. Patient ID:  Daisy Mckeon  [id number]  40 y.o.  1955    Admit date: 4/29/2019    Discharge date and time:  5/5/2019  11:52 AM    Admission Diagnoses: Principal Problem:    Gangrene of toe of right foot ([address]  Active Problems:    Lung mass    Sepsis ([address]    Infected abrasion of right foot    Acute pulmonary embolism ([address]    Severe protein-calorie malnutrition ([address]  Resolved Problems:    * No resolved hospital problems. *      Discharge Diagnoses: Principal Problem:    Gangrene of toe of right foot ([address]  Active Problems:    Lung mass    Sepsis ([address]    Infected abrasion of right foot    Acute pulmonary embolism (HCC)    Severe protein-calorie malnutrition ([address]  Resolved Problems:    * No resolved hospital problems. *      Consults:  IP CONSULT TO INFECTIOUS DISEASES  IP CONSULT TO PODIATRY  IP CONSULT TO DIETITIAN  IP CONSULT TO PULMONOLOGY  IP CONSULT TO DIETITIAN    Procedures: partial toe amputation    Hospital Course: Patient was admitted with Sepsis ([address] [A41.9]  Sepsis ([address] [A41.9]  Infected abrasion of right foot, initial encounter [S90.811A, L08.9]. Patient came with right foot pain, had infected foot, gangrenous distal toe,seen by ID, was on vancomycin / zosyn now changed to PO Doxycycline.  Patient also had CP on arrival, atypical, had abnormal D Dimer, CTA showed PE, seen by Pulmonary, was on heparin, after podiatry did partial amputation of right great toe he is changed to Eliquis by mouth. Patient with underlying Adenocarcinoma do have anasarca, his scrotal swelling has worsened, scrotal support advised, PT eval pending, however patient ambulated very well with me and his primary nurse with walker. Will DC home. Discharge Exam:  Vitals:    05/04/19 2100 05/04/19 2230 05/05/19 0129 05/05/19 0900   BP: 100/70 108/76  92/63   Pulse:    78   Resp:    16   Temp:    97.7 °F (36.5 °C)   TempSrc:    Oral   SpO2:    99%   Weight:   174 lb 4.8 oz (79.1 kg)    Height:           General Appearance: alert and oriented to person, place and time, well-developed and well-nourished, in no acute distress  Skin: warm and dry, no rash or erythema  Head: normocephalic and atraumatic  Eyes: pupils equal, round, and reactive to light, extraocular eye movements intact, conjunctivae normal  ENT: tympanic membrane, external ear and ear canal normal bilaterally, oropharynx clear and moist with normal mucous membranes  Neck: neck supple and non tender without mass, no thyromegaly or thyroid nodules, no cervical lymphadenopathy   Pulmonary/Chest: clear to auscultation bilaterally- no wheezes, rales or rhonchi, normal air movement, no respiratory distress  Cardiovascular: normal rate, normal S1 and S2, no gallops, intact distal pulses and no carotid bruits  Abdomen: soft, non-tender, non-distended, normal bowel sounds, no masses or organomegaly  I/O last 3 completed shifts:   In: 240 [P.O.:240]  Out: 175 [Urine:175]  I/O this shift:  In: 120 [P.O.:120]  Out: -       LABS:  Recent Labs     05/03/19 0330 05/04/19  0430 05/05/19  0350   * 135 138   K 2.9* 3.4* 3.7   CL 94* 97* 100   CO2 26 28 28   BUN 9 10 12   CREATININE 0.9 1.0 1.0   GLUCOSE 90 106* 103*   CALCIUM 7.0* 7.2* 7.4*       Recent Labs     05/03/19  0330 05/04/19  0430 05/05/19  0350   WBC 30.4* 28.2* 30.5*   RBC 3.53* 3.52* 3.56*   HGB 11.5* 11.2* 11.3*   HCT 36.7* 36.6* 36.4*   .0* tablet  Commonly known as:  ELIQUIS  Take 1 tablet by mouth 2 times daily Start after 3 days, once 10 mg dose is over     doxycycline hyclate 100 MG capsule  Commonly known as:  VIBRAMYCIN  Take 1 capsule by mouth every 12 hours for 10 days         * This list has 2 medication(s) that are the same as other medications prescribed for you. Read the directions carefully, and ask your doctor or other care provider to review them with you. CHANGE how you take these medications    aspirin 81 MG tablet  What changed:  Another medication with the same name was removed. Continue taking this medication, and follow the directions you see here.         CONTINUE taking these medications    atorvastatin 40 MG tablet  Commonly known as:  LIPITOR     Fish Oil 1200 MG Caps     furosemide 40 MG tablet  Commonly known as:  LASIX     gabapentin 300 MG capsule  Commonly known as:  NEURONTIN     GEMZAR IV     metoprolol tartrate 25 MG tablet  Commonly known as:  LOPRESSOR     multivitamin-iron-minerals-folic acid chewable tablet     nitroGLYCERIN 0.4 MG SL tablet  Commonly known as:  NITROSTAT     oxyCODONE HCl 10 MG immediate release tablet  Commonly known as:  OXY-IR     ZOMETA IV        STOP taking these medications    HYDROcodone-acetaminophen 5-325 MG per tablet  Commonly known as:  NORCO     METOPROLOL SUCCINATE ER PO           Where to Get Your Medications      These medications were sent to [address] [place] [address] Drive  [address] [place] [address] [facility] [address]    Phone:  [phone]   · apixaban 5 MG Tabs tablet  · apixaban 5 MG Tabs tablet  · doxycycline hyclate 100 MG capsule           Note that more than 30 minutes was spent in preparing discharge papers, discussing discharge with patient, medication review, etc.    Signed:  Electronically signed by Edwin Caputo MD on 5/5/2019 at 11:52 AM    NOTE: This report was transcribed using voice recognition software. Every effort was made to ensure accuracy; however, inadvertent computerized transcription errors may be present.

## 2019-05-05 NOTE — PROGRESS NOTES
Call placed to [facility] [place] regards [place] for wheeled walker for dc for today.     Electronically signed by Lindbergh Schilder, RN on 5/5/2019 at 12:03 PM

## 2019-05-05 NOTE — PROGRESS NOTES
Pulmonary Progress Note    Admit Date: 2019  Hospital day                               PCP: No primary care provider on file. Chief Complaint (s):  Patient Active Problem List   Diagnosis    Lung mass    Hyperlipidemia    MI (mitral incompetence)    Hypertension    [facility]    CAD (coronary artery disease)    Pleural effusion    Sepsis ([facility]    Infected abrasion of right foot    Acute pulmonary embolism (HCC)    Gangrene of toe of right foot (HCC)    Severe protein-calorie malnutrition (HCC)       Subjective:  · Up in the cash, walking a little bit gingerly. Vitals:  VITALS:  BP 92/63   Pulse 78   Temp 97.7 °F (36.5 °C) (Oral)   Resp 16   Ht 6' (1.829 m)   Wt 174 lb 4.8 oz (79.1 kg)   SpO2 99%   BMI 23.64 kg/m²     24HR INTAKE/OUTPUT:      Intake/Output Summary (Last 24 hours) at 2019 1218  Last data filed at 2019 3368  Gross per 24 hour   Intake 360 ml   Output 175 ml   Net 185 ml       24HR PULSE OXIMETRY RANGE:    SpO2  Av.5 %  Min: 98 %  Max: 99 %    Medications:  IV:      Scheduled Meds:   docusate sodium  100 mg Oral Daily    doxycycline hyclate  100 mg Oral 2 times per day    apixaban  10 mg Oral BID    aspirin  81 mg Oral Once per day on u    atorvastatin  40 mg Oral Nightly    furosemide  40 mg Oral QAM    gabapentin  300 mg Oral Nightly    metoprolol tartrate  12.5 mg Oral BID    therapeutic multivitamin-minerals  1 tablet Oral Weekly       Diet:   DIET GENERAL;  Dietary Nutrition Supplements: Wound Healing Oral Supplement     EXAM:  General: No distress. Alert. Eyes: PERRL. No sclera icterus. No conjunctival injection. ENT: No discharge. Pharynx clear. Neck: Trachea midline. Normal thyroid. Resp: No accessory muscle use. No rales. No wheezing. No rhonchi. CV: Regular rate. Regular rhythm. No murmur or rub. Abd: Non-tender. Non-distended. No masses. No organomegaly. Normal bowel sounds. Skin: Warm and dry.  No nodule on exposed extremities. No rash on exposed extremities. Ext: No cyanosis, clubbing, edema  Lymph: No cervical LAD. No supraclavicular LAD. M/S: No cyanosis. No joint deformity. No clubbing. Neuro: Awake. Follows commands. Positive pupils/gag/corneals. Normal pain response. Results:  CBC:   Recent Labs     05/03/19 0330 05/04/19 0430 05/05/19  0350   WBC 30.4* 28.2* 30.5*   HGB 11.5* 11.2* 11.3*   HCT 36.7* 36.6* 36.4*   .0* 104.0* 102.2*    279 271     BMP:   Recent Labs     05/03/19 0330 05/04/19 0430 05/05/19  0350   * 135 138   K 2.9* 3.4* 3.7   CL 94* 97* 100   CO2 26 28 28   BUN 9 10 12   CREATININE 0.9 1.0 1.0     LIVER PROFILE:   No results for input(s): AST, ALT, LIPASE, BILIDIR, BILITOT, ALKPHOS in the last 72 hours. Invalid input(s): AMYLASE,  ALB  PT/INR: No results for input(s): PROTIME, INR in the last 72 hours. APTT:   No results for input(s): APTT in the last 72 hours. Pathology:  1. N/A      Microbiology:  1. None    Recent ABG:   No results for input(s): PH, PO2, PCO2, HCO3, BE, O2SAT, METHB, O2HB, COHB, O2CON, HHB, THB in the last 72 hours. Recent Films:  CTA CHEST W CONTRAST   Final Result   Significant amount of collapse of the left upper lobe with a rind of   pleural thickening, atelectasis and suspected tumor. The tumor appears   to encase the left upper lobe pulmonary artery and does encase some of   the left upper lobe bronchus. This appears unchanged. Moderate size right pleural effusion and small to moderate sized left   pleural effusion. Areas of infiltrate in the right hemithorax with new area in the right   middle lobe and worsening right basilar atelectasis.       Development of small acute embolus to a left lower lobe branch as well   as moderate sized embolus to the distal main -proximal right lower   lobe pulmonary artery branch and thrombus in the right middle lobe   branch      Large amount of upper abdominal ascites, unchanged               CRITICAL FINDING: Results were called and read back to Dr. Ana Srinivasan on   4/30/2019 at 1500 hours      XR CHEST PORTABLE   Final Result   ALERT:  THIS IS AN ABNORMAL REPORT   1. Suspected infiltrative mass/malignancy left hilar and suprahilar   region. 2. Multifocal airspace disease left upper lung and bilateral lung   bases could be reflective of atelectasis/scarring and/or   infiltrate/pneumonia with suspected small bilateral pleural effusions. 3. Underlying mild central pulmonary vascular congestion. XR FOOT RIGHT (MIN 3 VIEWS)   Final Result   Mild dorsal forefoot soft tissue swelling and degenerative changes of   the first MTP joint without other significant findings.                   Assessment:  1. Bilateral pulmonary emboli  2. Thrombophilia of malignancy  3. Necrotic toe  4. History of non-small cell carcinoma the lung  5. Abdominal fluid: Unclear etiology but it appears to surround the liver and is likely sympathetic effusion around the right lung.        Plan:  1. Continue Eliquis: Dosage will be 10 mg twice daily to complete one week followed by 5 mg twice daily to complete 6 months followed by 2.5 mg twice daily for life. 2. RONDA hose  3. For discharge today         Care reviewed with the staff and the patient's family as available. Please note that voice recognition technology was used in the preparation of this note and make therefore it may contain inadvertent transcription errors. Marely Frazier M.D., F.C.C.P.     [facility] [address], [address] [place] [address]

## 2019-05-05 NOTE — PLAN OF CARE
Problem: Pain:  Description  Pain management should include both nonpharmacologic and pharmacologic interventions.   Goal: Pain level will decrease  Description  Pain level will decrease  Outcome: Met This Shift  Goal: Control of acute pain  Description  Control of acute pain  Outcome: Met This Shift  Goal: Control of chronic pain  Description  Control of chronic pain  Outcome: Met This Shift     Problem: Falls - Risk of:  Goal: Will remain free from falls  Description  Will remain free from falls  Outcome: Met This Shift  Goal: Absence of physical injury  Description  Absence of physical injury  Outcome: Met This Shift     Problem: Skin Integrity:  Goal: Will show no infection signs and symptoms  Description  Will show no infection signs and symptoms  Outcome: Met This Shift  Goal: Absence of new skin breakdown  Description  Absence of new skin breakdown  Outcome: Met This Shift  Goal: Skin integrity will be maintained  Description  Skin integrity will be maintained  Outcome: Met This Shift  Goal: Skin integrity will improve  Description  Skin integrity will improve  Outcome: Met This Shift     Problem: Sensory:  Goal: Pain level will decrease  Description  Pain level will decrease  Outcome: Met This Shift

## 2019-05-05 NOTE — PROGRESS NOTES
[facility] does not deliver walkers. Patient will have to take order to [facility] and  walker. Order was also faxed to [facility].     Electronically signed by Robby Campos RN on 5/5/2019 at 12:15 PM

## 2019-05-06 NOTE — TELEPHONE ENCOUNTER
Questioning whether you will follow PT/OT/Skilled Nursing through [facility].  Please call to give verbal    [phone]

## 2019-05-10 PROBLEM — A41.9 SEPSIS DUE TO CELLULITIS (HCC): Status: ACTIVE | Noted: 2019-01-01

## 2019-05-10 PROBLEM — N17.9 AKI (ACUTE KIDNEY INJURY) (HCC): Status: ACTIVE | Noted: 2019-01-01

## 2019-05-10 PROBLEM — L03.90 SEPSIS DUE TO CELLULITIS (HCC): Status: ACTIVE | Noted: 2019-01-01

## 2019-05-10 NOTE — H&P
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 No pain, no discharges, no pus leaking, there is small ulcer on the sacral area. On Discharge his WBC was 30. Compliant on meds. In ER  · JESSE Cr 2.2, noticed decline in urine output recently , Na 126 hyponatremia   · INR 5.5 no recent INR to compare with, pt is on eliquis 5 BID for recent finding of PE on last admission   · Albumin 1.7, INR 5.5, bilirubin 0.5, AST//123, USP     Denied any fever, night sweats,  chest pain, SOB, nausea, vomiting, diarrhea, abdominal pain, blood in stool or black stool.      Informant(s) for H&P:Pt and chart     REVIEW OF SYSTEMS:  A comprehensive review of systems was negative except for: what is in the HPI    PMH:  Past Medical History:   Diagnosis Date    Acute pulmonary embolism ([facility] 5/1/2019    CAD (coronary artery disease)     COPD (chronic obstructive pulmonary disease) (HCC)     Gangrene of toe of right foot ([facility] 5/1/2019    Hyperlipidemia     Hypertension     Lung cancer ([facility]     MI (mitral incompetence)      Surgical History:  Past Surgical History:   Procedure Laterality Date    ANGIOPLASTY  03/2017    lower legs    CORONARY ANGIOPLASTY WITH STENT PLACEMENT  2009    stents placed    LUNG BIOPSY      THORACENTESIS Left 09/24/2017    TOE AMPUTATION Right 5/1/2019    PARTIAL AMPUTATION RIGHT GREAT TOE performed by Shubham Amaro DPM at [facility] TOE AMPUTATION         Medications Prior to Admission:    Prior to Admission medications    Medication Sig Start Date End Date Taking? Authorizing Provider   apixaban (ELIQUIS) 5 MG TABS tablet Take 1 tablet by mouth 2 times daily Start after 3 days, once 10 mg dose is over 5/5/19 6/4/19  Renato Pierson MD   doxycycline hyclate (VIBRAMYCIN) 100 MG capsule Take 1 capsule by mouth every 12 hours for 10 days 5/4/19 5/14/19  Evelyn Bey MD   aspirin 81 MG tablet Take 81 mg by mouth Twice a Week    Historical Provider, MD   furosemide (LASIX) 40 MG tablet Take 1 tablet by mouth every morning 4/26/19   Historical Provider, MD   gabapentin (NEURONTIN) 300 MG capsule Take 1 capsule by mouth nightly. 4/12/19   Historical Provider, MD   metoprolol tartrate (LOPRESSOR) 25 MG tablet Take 12.5 mg by mouth 2 times daily 4/15/19   Historical Provider, MD   nitroGLYCERIN (NITROSTAT) 0.4 MG SL tablet Place 1 tablet under the tongue if needed every 5 minutes for chest pain 3/18/19   Historical Provider, MD   oxyCODONE HCl (OXY-IR) 10 MG immediate release tablet Take 1 tablet by mouth every 4-6 hours as needed. 4/25/19   Historical Provider, MD   Gemcitabine HCl (GEMZAR IV) Infuse intravenously every 28 days    Historical Provider, MD   Zoledronic Acid (ZOMETA IV) Infuse intravenously    Historical Provider, MD   atorvastatin (LIPITOR) 40 MG tablet Take 40 mg by mouth nightly     Historical Provider, MD   Omega-3 Fatty Acids (FISH OIL) 1200 MG CAPS Take 1 capsule by mouth Twice a Week     Historical Provider, MD   multivitamin-iron-minerals-folic acid (CENTRUM) chewable tablet Take 1 tablet by mouth once a week     Historical Provider, MD       Allergies:    Patient has no known allergies. Social History:    reports that he quit smoking about 19 months ago. His smoking use included cigarettes. He started smoking about 47 years ago. He has a 90.00 pack-year smoking history. He has never used smokeless tobacco. He reports that he drinks about 3.6 oz of alcohol per week. He reports that he does not use drugs.     Family History: tissues compatible with   third spacing or cellulitis. US ABDOMEN COMPLETE    (Results Pending)       EKG:   ASSESSMENT and PLAN:      Problem   Wiliam (Acute Kidney Injury) (Hcc)   Status Post Amputation of Right Great Toe (Hcc)   Lactic Acidosis   Elevated Liver Enzymes   Other Ascites   Prolonged Inr   Hyponatremia   Severe Protein-Calorie Malnutrition (Hcc)   Hyperlipidemia   Hypertension     Pt was discharged on May 5th, no major lab abnormalities difference since then. During the last admission he had LA 4.9, never dropped. WILIAM stage II, with peripheral edema and possible ascites, in settings of decreased Albumin (1.7), INR 5.5, elevated liver enzymes, will do US liver and also US kidneys. Patient might have cirrhosis, paracentesis may be required if significant fluid is found on US. The right foot including site of surgery doesn't look infected, no drainage, no tender, not warm, the swelling is bilateral related to WILIAM with possible nephrotic syndrome. Doxycycline can prolong INR in setting of warfarin use, but patient is on eliquis, will see if he had mets to liver. Pt received IV contrast on Apr 30th when he had the CTA chest, but Cr was okay till discharge on May 5th, thus contrast  Induced nephropathy is less likely.  This is more likely due to intravascular volume depletion     · S/w albumin 25% q6h x 6 doses  · Urine lytes, check FeNa, alb/cr   ·  ml/hr for 10 hrs   · C/w Doxycycline course   · Will hold eliquis in settings of increased INR   · US abdomen to evaluate liver and also kidneys, rule out mets to liver   · Nephrology consult  · Hyponatremia might be induced by hypoalbuminemia     Code Status: Full   DVT prophylaxis: Already elevated INR   Gi prophylaxis: famotidine   Diet: General     Electronically signed by Lewis Skaggs MD on 5/11/2019 at 2:27 AM

## 2019-05-10 NOTE — ED NOTES
Bed: 13  Expected date:   Expected time:   Means of arrival:   Comments:  Nazario Mccarty RN  05/10/19 0744

## 2019-05-10 NOTE — ED PROVIDER NOTES
.     Department of Emergency Medicine   ED  Provider Note  Admit Date/RoomTime: 5/10/2019  5:06 PM  ED Room: 13/13          History of Present Illness:  5/10/19, Time: 6:02 PM         Oma Benson is a 59 y.o. male presenting to the ED for leg swelling and fatigue, beginning a few days ago. The complaint has been persistent, moderate in severity, and worsened by nothing. The pt reports feeling fatigued and generally weak. He states his legs have been swollen as well. Pt had his right great toe amputated 1.5 weeks ago. Dressing in place to RLE. pt denies any fever, chest pain, sob, vomiting, diarrhea, or any further complaints. Review of Systems:   Pertinent positives and negatives are stated within HPI, all other systems reviewed and are negative.      --------------------------------------------- PAST HISTORY ---------------------------------------------  Past Medical History:  has a past medical history of Acute pulmonary embolism ([facility] [address] CAD (coronary artery disease), COPD (chronic obstructive pulmonary disease) ([facility] [address] Gangrene of toe of right foot ([facility] [address] Hyperlipidemia, Hypertension, Lung cancer ([facility] [address] and MI (mitral incompetence). Past Surgical History:  has a past surgical history that includes angioplasty (03/2017); Coronary angioplasty with stent (2009); thoracentesis (Left, 09/24/2017); Lung biopsy; Toe amputation (Right, 5/1/2019); and Toe amputation. Social History:  reports that he quit smoking about 19 months ago. His smoking use included cigarettes. He started smoking about 47 years ago. He has a 90.00 pack-year smoking history. He has never used smokeless tobacco. He reports that he drinks about 3.6 oz of alcohol per week. He reports that he does not use drugs. Family History: family history includes Cancer in his mother. The patients home medications have been reviewed.     Allergies: Patient has no known Neutrophils # 33.65 (H) 1.80 - 7.30 E9/L    Immature Granulocytes # 0.66 E9/L    Lymphocytes # 0.83 (L) 1.50 - 4.00 E9/L    Monocytes # 2.52 (H) 0.10 - 0.95 E9/L    Eosinophils # 0.10 0.05 - 0.50 E9/L    Basophils # 0.05 0.00 - 0.20 E9/L    Anisocytosis 1+     Poikilocytes 1+     Schistocytes 1+     Shari Cells 1+     Ovalocytes 1+    Basic Metabolic Panel w/ Reflex to MG   Result Value Ref Range    Sodium 126 (L) 132 - 146 mmol/L    Potassium reflex Magnesium 5.2 (H) 3.5 - 5.0 mmol/L    Chloride 89 (L) 98 - 107 mmol/L    CO2 23 22 - 29 mmol/L    Anion Gap 14 7 - 16 mmol/L    Glucose 126 (H) 74 - 99 mg/dL    BUN 37 (H) 8 - 23 mg/dL    CREATININE 2.2 (H) 0.7 - 1.2 mg/dL    GFR Non-African American 30 >=60 mL/min/1.73    GFR African American 37     Calcium 6.9 (L) 8.6 - 10.2 mg/dL   Protime-INR   Result Value Ref Range    Protime 61.2 (H) 9.3 - 12.4 sec    INR 5.5 (HH)    APTT   Result Value Ref Range    aPTT 56.3 (H) 24.5 - 35.1 sec       RADIOLOGY:  Interpreted by Radiologist.  US DUP LOWER EXTREMITIES BILATERAL VENOUS   Final Result      No evidence for deep vein thrombosis of the lower extremities. Diffuse fluid throughout the subcutaneous tissues compatible with   third spacing or cellulitis.            ------------------------- NURSING NOTES AND VITALS REVIEWED ---------------------------   The nursing notes within the ED encounter and vital signs as below have been reviewed by myself. BP 92/67   Pulse 101   Temp 97.6 °F (36.4 °C) (Oral)   Resp 18   Ht 5' 11\" (1.803 m)   Wt 174 lb (78.9 kg)   SpO2 95%   BMI 24.27 kg/m²   Oxygen Saturation Interpretation: Normal    The patients available past medical records and past encounters were reviewed.         ------------------------------ ED COURSE/MEDICAL DECISION MAKING----------------------  Medications   0.9 % sodium chloride bolus (has no administration in time range)   vancomycin 1000 mg IVPB in 250 mL D5W addavial (has no administration in time range) piperacillin-tazobactam (ZOSYN) 3.375 g in dextrose 5 % 50 mL IVPB (mini-bag) (has no administration in time range)            Medical Decision Making:    Pt presents for fatigue and leg swelling. Pt evaluated. IV fluids and medication given. US and labs obtained and reviewed. Results discussed with pt. Decision to admit. This patient's ED course included: a personal history and physicial examination and re-evaluation prior to disposition    This patient has remained hemodynamically stable and been closely monitored during their ED course. Results noted, pt non compliant since dc, inc wbc, leg doesn't appear to be [employer], iv abx and admit    Re-Evaluations:           Re-evaluation. Patient is resting, in no acute distress. Discussed results and plan for admission with pt. Pt is agreeable. Consultations:  Time: 7:27 PM.   Spoke with Dr. Oscar Souza (Medicine). Discussed case. He will admit this patient. Counseling: The emergency provider has spoken with the patient and discussed todays results, in addition to providing specific details for the plan of care and counseling regarding the diagnosis and prognosis. Questions are answered at this time and they are agreeable with the plan.       --------------------------------- IMPRESSION AND DISPOSITION ---------------------------------    IMPRESSION  1. Cellulitis of right lower extremity Stable       DISPOSITION  Disposition: Admit  Patient condition is stable    5/10/19, 6:02 PM.    This note is prepared by Abbie Sinha, acting as Scribe for Luis Dawson MD.    Luis Dawson MD:  The scribe's documentation has been prepared under my direction and personally reviewed by me in its entirety. I confirm that the note above accurately reflects all work, treatment, procedures, and medical decision making performed by me.              Luis Dawson MD  05/14/19 1018

## 2019-05-11 PROBLEM — E87.1 HYPONATREMIA: Status: ACTIVE | Noted: 2019-01-01

## 2019-05-11 PROBLEM — E87.20 LACTIC ACIDOSIS: Status: ACTIVE | Noted: 2019-01-01

## 2019-05-11 PROBLEM — Z89.411 STATUS POST AMPUTATION OF RIGHT GREAT TOE (HCC): Chronic | Status: ACTIVE | Noted: 2019-01-01

## 2019-05-11 PROBLEM — R18.8 OTHER ASCITES: Status: ACTIVE | Noted: 2019-01-01

## 2019-05-11 PROBLEM — R74.8 ELEVATED LIVER ENZYMES: Status: ACTIVE | Noted: 2019-01-01

## 2019-05-11 PROBLEM — Z89.411 STATUS POST AMPUTATION OF RIGHT GREAT TOE (HCC): Status: ACTIVE | Noted: 2019-01-01

## 2019-05-11 PROBLEM — R79.1 PROLONGED INR: Status: ACTIVE | Noted: 2019-01-01

## 2019-05-11 NOTE — HOME CARE
Patient current with [facility] for. SN,PT/OT. Will need LY orders if appropriate at discharge. Marce Monreal LPN  [facility].

## 2019-05-11 NOTE — PROGRESS NOTES
Spoke with Dr. Lara Sun regarding IVF and lactic acid 5.1. New orders received for NS @ 40/hr  x10hrs and to repeat lactic at 2100.

## 2019-05-11 NOTE — CONSULTS
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 He presented with complains of leg swelling and scrotal edema. From nephrology perspective he had preserved renal fn despite getting carboplatin in past and recent contrast exposure etc. But he also had risk factors for ATN persisting and was on diuretics had hypotension. Currently nephrology consulted for management of the JESSE on CKD.          Past Medical History:        Diagnosis Date    Acute pulmonary embolism ([facility] [address] 5/1/2019    CAD (coronary artery disease)     COPD (chronic obstructive pulmonary disease) (HCC)     Gangrene of toe of right foot ([facility] [address] 5/1/2019    Hyperlipidemia     Hypertension     Lung cancer ([facility] [address]     MI (mitral incompetence)      Past Surgical History:        Procedure Laterality Date    ANGIOPLASTY  03/2017    lower legs    CORONARY ANGIOPLASTY WITH STENT PLACEMENT  2009    stents placed    LUNG BIOPSY      THORACENTESIS Left 09/24/2017    TOE AMPUTATION Right 5/1/2019    PARTIAL AMPUTATION RIGHT GREAT TOE performed by Heide Baez DPM at [facility] 22 TOE AMPUTATION       Current Medications:    Current Facility-Administered Medications: atorvastatin (LIPITOR) tablet 40 mg, 40 mg, Oral, Nightly  doxycycline hyclate (VIBRAMYCIN) capsule 100 mg, 100 mg, Oral, 2 times per day  metoprolol tartrate (LOPRESSOR) tablet 12.5 mg, 12.5 mg, Oral, BID  multivitamin-iron-minerals-folic acid (CENTRUM) chewable tablet 1 tablet, 1 tablet, Oral, Weekly  sodium chloride flush 0.9 % injection 10 mL, 10 mL, Intravenous, 2 times per day  sodium chloride flush 0.9 % injection 10 mL, 10 mL, Intravenous, PRN  potassium chloride (KLOR-CON M) extended release tablet 40 mEq, 40 mEq, Oral, PRN **OR** potassium bicarb-citric acid (EFFER-K) effervescent tablet 40 mEq, 40 mEq, Oral, PRN **OR** potassium chloride 10 mEq/100 mL IVPB (Peripheral Line), 10 mEq, Intravenous, PRN  magnesium sulfate 1 g in dextrose 5% 100 mL IVPB, 1 g, Intravenous, PRN  magnesium hydroxide (MILK OF MAGNESIA) 400 MG/5ML suspension 30 mL, 30 mL, Oral, Daily PRN  ondansetron (ZOFRAN) injection 4 mg, 4 mg, Intravenous, Q6H PRN  acetaminophen (TYLENOL) tablet 650 mg, 650 mg, Oral, Q4H PRN  famotidine (PEPCID) injection 20 mg, 20 mg, Intravenous, Daily  albumin human 25 % solution 25 g, 25 g, Intravenous, Q6H  Allergies:  Patient has no known allergies. Social History:    Social History     Socioeconomic History    Marital status: Single     Spouse name: Not on file    Number of children: Not on file    Years of education: Not on file    Highest education level: Not on file   Occupational History    Not on file   Social Needs    Financial resource strain: Not on file    Food insecurity:     Worry: Not on file     Inability: Not on file    Transportation needs:     Medical: Not on file     Non-medical: Not on file   Tobacco Use    Smoking status: Former Smoker     Packs/day: 2.00     Years: 45.00     Pack years: 90.00     Types: Cigarettes     Start date: 1971     Last attempt to quit: 9/15/2017     Years since quittin.6    Smokeless tobacco: Never Used   Substance and Sexual Activity    Alcohol use:  Yes     Alcohol/week: 3.6 oz     Types: 6 Cans of beer per week     Comment: wkly-no acohol in six weeks    Drug use: No    Sexual activity: Not on file   Lifestyle    Physical activity:     Days per week: Not on file     Minutes per session: Not on file    Stress: Not on file   Relationships    Social connections:     Talks on phone: Not on file     Gets together: Not on file     Attends Latter day service: Not on file     Active member of club or organization: Not on file     Attends meetings of clubs or organizations: Not on file     Relationship status: Not on file    Intimate partner violence:     Fear of current or ex partner: Not on file     Emotionally abused: Not on file     Physically abused: Not on file     Forced sexual activity: Not on file   Other Topics Concern    Not on file   Social History Narrative    Not on file       Family History:       Problem Relation Age of Onset    Cancer Mother         colon     REVIEW OF SYSTEMS:      10 point ros done and negative unless specified int he HPI     PHYSICAL EXAM:      Vitals:    VITALS:  /64 Comment: manual   Pulse 99   Temp 97.8 °F (36.6 °C) (Oral)   Resp 16   Ht 6' (1.829 m)   Wt 184 lb 11.2 oz (83.8 kg)   SpO2 94%   BMI 25.05 kg/m²     GENERAL: Mild distress, weak tired   EYES: no pallor, no icterus   NOSE EAR THROAT: no ulcers, no rashes, no drainage   NECK: thyroid not palpable, LN not appreciated   RESP: air entry b/l , No added sounds. CVS: S1 S2 heard, No murmur gallop or rub appreciated   GI: distended,  Ascites  BS +  MS/ Ext: Joints normal, no swelling. No edema, Pulses equal.   NEURO: AAOx3, strength and sensation grossly intact.      DATA:    CBC:   Lab Results   Component Value Date    WBC 38.3 05/11/2019    RBC 2.98 05/11/2019    HGB 9.7 05/11/2019    HCT 30.6 05/11/2019    .7 05/11/2019    MCH 32.6 05/11/2019    MCHC 31.7 05/11/2019    RDW 17.6 05/11/2019     05/11/2019    MPV 12.8 05/11/2019     CMP:    Lab Results   Component Value Date     05/11/2019    K 4.1 05/11/2019    CL 94 05/11/2019    CO2 22 05/11/2019    BUN 39 05/11/2019    CREATININE 2.1 05/11/2019    GFRAA 39 05/11/2019    LABGLOM 32 05/11/2019    GLUCOSE 129 05/11/2019    PROT 3.9 05/11/2019    LABALBU 1.8 05/11/2019    CALCIUM

## 2019-05-11 NOTE — PROGRESS NOTES
Tiffany Valdivia Hospitalist   Progress Note    Admitting Date and Time: 5/10/2019  5:06 PM  Admit Dx: Sepsis due to cellulitis ([address] [L03.90, A41.9]  Sepsis due to cellulitis ([address] [L03.90, A41.9]  JESSE (acute kidney injury) ([address] [N17.9]  JESSE (acute kidney injury) ([address] [N17.9]    Subjective: This note is in addition to resident note from last night which is reviewed and spoke to resident. In brief Patient with stage IV ca lung, recently had right big toe partial amputation, had PE and left on ELIQUIS, came back as more swollen, find difficulty in taking care of himself, note with JESSE when admitted. Patient was admitted with Sepsis due to cellulitis ([address] [L03.90, A41.9]  Sepsis due to cellulitis ([address] [L03.90, A41.9]  JESSE (acute kidney injury) ([address] [N17.9]  JESSE (acute kidney injury) ([address] [N17.9]. Patient feels tired. Per RN: No issues over night    ROS: denies fever, chills, cp, sob, n/v, HA unless stated above.      atorvastatin  40 mg Oral Nightly    doxycycline hyclate  100 mg Oral 2 times per day    gabapentin  300 mg Oral Nightly    metoprolol tartrate  12.5 mg Oral BID    multivitamin-iron-minerals-folic acid  1 tablet Oral Weekly    sodium chloride flush  10 mL Intravenous 2 times per day    famotidine (PEPCID) injection  20 mg Intravenous Daily    albumin human  25 g Intravenous Q6H       sodium chloride flush 10 mL PRN   potassium chloride 40 mEq PRN   Or     potassium alternative oral replacement 40 mEq PRN   Or     potassium chloride 10 mEq PRN   magnesium sulfate 1 g PRN   magnesium hydroxide 30 mL Daily PRN   ondansetron 4 mg Q6H PRN   acetaminophen 650 mg Q4H PRN        Objective:    /64 Comment: manual   Pulse 99   Temp 97.8 °F (36.6 °C) (Oral)   Resp 16   Ht 6' (1.829 m)   Wt 184 lb 11.2 oz (83.8 kg)   SpO2 94%   BMI 25.05 kg/m²   General Appearance: alert and oriented to person, place and time, well-developed and well-nourished, in no acute distress  Skin: warm and dry, no rash or erythema and Anasarca +  Head: normocephalic and atraumatic  Eyes: pupils equal, round, and reactive to light, extraocular eye movements intact, conjunctivae normal  ENT: tympanic membrane, external ear and ear canal normal bilaterally, oropharynx clear and moist with normal mucous membranes  Neck: neck supple and non tender without mass, no thyromegaly or thyroid nodules, no cervical lymphadenopathy   Pulmonary/Chest: clear to auscultation bilaterally- no wheezes, rales or rhonchi, normal air movement, no respiratory distress  Cardiovascular: normal rate, normal S1 and S2, no gallops, intact distal pulses and no carotid bruits  Abdomen: soft, non-tender, non-distended, normal bowel sounds, no masses or organomegaly      Recent Labs     05/10/19  1803 05/11/19  0653   * 129*   K 5.2* 4.1   CL 89* 94*   CO2 23 22   BUN 37* 39*   CREATININE 2.2* 2.1*   GLUCOSE 126* 129*   CALCIUM 6.9* 6.4*       Recent Labs     05/10/19  1803 05/11/19  0653   WBC 37.8* 38.3*   RBC 3.76* 2.98*   HGB 12.2* 9.7*   HCT 37.5 30.6*   MCV 99.7 102.7*   MCH 32.4 32.6   MCHC 32.5 31.7*   RDW 17.6* 17.6*    102*   MPV 13.0* 12.8*           Radiology:   US ABDOMEN COMPLETE   Final Result      1. Findings are suggestive of hepatocellular disease. 2. Nonspecific thickened appearance of gallbladder wall. No evidence   of cholelithiasis. These findings can be associated with chronic   hepatocellular disease. 3. Ascites. XR FOOT RIGHT (2 VIEWS)   Final Result   1. Interval partial amputation of the right great toe.   2. Significant soft tissue edema of the dorsal right foot. US DUP LOWER EXTREMITIES BILATERAL VENOUS   Final Result      No evidence for deep vein thrombosis of the lower extremities. Diffuse fluid throughout the subcutaneous tissues compatible with   third spacing or cellulitis.           Assessment:    Active Problems:    Severe protein-calorie malnutrition ([facility] [address]    Sepsis due to cellulitis ([facility] [address]    JESSE (acute kidney injury) ([facility] [address]    Status post amputation of right great toe (HCC)    Lactic acidosis    Elevated liver enzymes    Other ascites    Prolonged INR    Hyponatremia  Resolved Problems:    * No resolved hospital problems. *      Plan:  1. JESSE / on IVF / Nephrology to follow. 2. Supratheraputic INR / US was requested / Eliquis on hold / empiric Vit K  3. Recent PE / On anticoagulation  4. Ca Lung with Mets  5. Hyponatremia   6. Anasarca   7. Hypoalbuminimia   8. Unable to take care of himself / worsened due to above 2 / needs PT / may need albumin and Lasix, with JESSE will wait till Renal input.         Electronically signed by Marylen Hawthorn, MD on 5/11/2019 at 10:54 AM

## 2019-05-12 NOTE — PROGRESS NOTES
Nephrology Progress Note      Sub: doing poor, weak tired swelling not much change     PHYSICAL EXAM:      Vitals:    VITALS:  BP (!) 95/58   Pulse 99   Temp 98 °F (36.7 °C) (Oral)   Resp 20   Ht 6' (1.829 m)   Wt 185 lb 8 oz (84.1 kg)   SpO2 92%   BMI 25.16 kg/m²     GENERAL: Mild distress, weak tired   EYES: no pallor, no icterus   NOSE EAR THROAT: no ulcers, no rashes, no drainage   NECK: thyroid not palpable, LN not appreciated   RESP: air entry b/l , No added sounds. CVS: S1 S2 heard, No murmur gallop or rub appreciated   GI: distended,  Ascites  BS +  MS/ Ext: Mod to severe  edema, Pulses equal.       DATA:    CBC:   Lab Results   Component Value Date    WBC 34.2 05/12/2019    RBC 2.39 05/12/2019    HGB 7.6 05/12/2019    HCT 24.2 05/12/2019    .9 05/12/2019    MCH 33.1 05/12/2019    MCHC 32.1 05/12/2019    RDW 17.7 05/12/2019    PLT 73 05/12/2019    MPV 13.0 05/12/2019     CMP:    Lab Results   Component Value Date     05/12/2019    K 3.9 05/12/2019    K 3.9 05/12/2019    CL 95 05/12/2019    CO2 21 05/12/2019    BUN 40 05/12/2019    CREATININE 2.3 05/12/2019    GFRAA 35 05/12/2019    LABGLOM 29 05/12/2019    GLUCOSE 91 05/12/2019    PROT 4.2 05/12/2019    LABALBU 2.9 05/12/2019    CALCIUM 6.5 05/12/2019    BILITOT 1.6 05/12/2019    ALKPHOS 284 05/12/2019    AST 99 05/12/2019    ALT 60 05/12/2019     ABG:  No results found for: PHART, DEK0ROU, PO2ART, SYG4ACV, BEART, THGBART, UGA8XIV, M5MJEYNA    IMPRESSION/RECOMMENDATIONS:      1. Wiliam: atn hemodynamic insult/Drug /chemo therapy  hypoperfusion / IAHT / r/o onset of hepatorenal physiology   2. Stage 4 lung cancer   3. CKD 1: sclerosis from comorbidity   4. Hyponatremia: hypoperfused/ excess adh physiology   5. Liver failure/ ascites /hypoalbumenimia/ hyper nh4+  raised INR with coumadin   6. Acidosis: mild gap/ lactic acid positive   7. Hyperkalemia: resolved   8. Hypocalcemia: impact of bisphos? 9. Anemia: hemonc following   10.  H/o PE 1. Cr no signs of improvement   2. Electrolytes holding stable for now   3. Will continue with midodrine high dose and albumin   4. Add low dose bumex for today to see impact on urine output and BP   5. Check PTH/ ca Junior Neil / before aggressive replacement with IV calcium   6.  Continue to follow labs closely

## 2019-05-12 NOTE — PROGRESS NOTES
Dr. Ceci Rivera  Notified of consult via perfect serve connected to answering service  Efe DYER/USWayen/[place] 5/12/2019 10:37 AM

## 2019-05-12 NOTE — PROGRESS NOTES
S: Patient states was too weak to ambulate at home, home alone and doesn't have enough help, had toe amputation about 2 weeks ago, INR high on coumadin and denies gross bleeding but occult stool +, intermittent reflux  O:  Gen- awake, sitting in bed, talking,NAD  HEENt- mmm  Lungs- decreased air movment bilaterally  Cardiac- regular  Mediport right chest site clean  abd- distended +BS non tender  Ext- warm, right foot s/p great toe amputation with crusting old blood, sutures inplace, edema both legs    Labs: wbc=34 hb=7.4 plt=73 INR=6.2 creatinie 2.3  Calcium 6.5 alb 2.9    A/P:  59year old gentleman known to Dr. Boris Ross with stage IV squamous cell lung cancer progressed after carbo-taxol, opdivo and now on gemzar but has been put on hold due to recent toe amputation. Admitted with anemia, occult + stool, supratherapeutic INR and declining performance status, hypocalcemia. 1. Replete calcium, check ionized calcium  2. Vit K given and FFP for high INR, coumadin held  3. Check H/H at 1pm, if Hb 7.5 or less transfuse 1 unit rbc  4. Daily cbcm check iron levels, etc  5.  Leukocytosis for the last 2 weeks, check urine culture, he is on vibramycin    [facility]

## 2019-05-12 NOTE — PROGRESS NOTES
Dr. Julian Gaitan  Notified of consult via perfect serve connected to answering service  Benito DYER/[place]Way[place] 5/12/2019 10:56 AM     Consult for Susana Alarcon was called via perfect service and cancelled Madeline Langeial 5/12/2019 10:58 AM

## 2019-05-12 NOTE — PROGRESS NOTES
pupils equal, round, and reactive to light, extraocular eye movements intact, conjunctivae normal  ENT: tympanic membrane, external ear and ear canal normal bilaterally, oropharynx clear and moist with normal mucous membranes  Neck: neck supple and non tender without mass, no thyromegaly or thyroid nodules, no cervical lymphadenopathy   Pulmonary/Chest: clear to auscultation bilaterally- no wheezes, rales or rhonchi, normal air movement, no respiratory distress  Cardiovascular: normal rate, normal S1 and S2, no gallops, intact distal pulses and no carotid bruits  Abdomen: soft, non-tender, non-distended, normal bowel sounds, no masses or organomegaly      Recent Labs     05/10/19  1803 05/11/19  0653 05/12/19  0410   * 129* 131*   K 5.2* 4.1 3.9  3.9   CL 89* 94* 95*   CO2 23 22 21*   BUN 37* 39* 40*   CREATININE 2.2* 2.1* 2.3*   GLUCOSE 126* 129* 91   CALCIUM 6.9* 6.4* 6.5*       Recent Labs     05/10/19  1803 05/11/19  0653 05/12/19  0410   WBC 37.8* 38.3* 34.2*   RBC 3.76* 2.98* 2.39*   HGB 12.2* 9.7* 7.9*   HCT 37.5 30.6* 24.6*   MCV 99.7 102.7* 102.9*   MCH 32.4 32.6 33.1   MCHC 32.5 31.7* 32.1   RDW 17.6* 17.6* 17.7*    102* 73*   MPV 13.0* 12.8* 13.0*       Cr 2.3  HGB 7.9    Radiology:   US ABDOMEN COMPLETE   Final Result      1. Findings are suggestive of hepatocellular disease. 2. Nonspecific thickened appearance of gallbladder wall. No evidence   of cholelithiasis. These findings can be associated with chronic   hepatocellular disease. 3. Ascites. XR FOOT RIGHT (2 VIEWS)   Final Result   1. Interval partial amputation of the right great toe.   2. Significant soft tissue edema of the dorsal right foot. US DUP LOWER EXTREMITIES BILATERAL VENOUS   Final Result      No evidence for deep vein thrombosis of the lower extremities. Diffuse fluid throughout the subcutaneous tissues compatible with   third spacing or cellulitis.           Assessment:    Active Problems:    Severe protein-calorie malnutrition (HCC)    Sepsis due to cellulitis ([facility] [address]    JESSE (acute kidney injury) ([facility] [address]    Status post amputation of right great toe (HCC)    Lactic acidosis    Elevated liver enzymes    Other ascites    Prolonged INR    Hyponatremia  Resolved Problems:    * No resolved hospital problems. *      Plan:  1. JESSE / on IVF / Nephrology saw patient / On Albumin / lasix / Midodrine  2. Supratheraputic INR / [address] was requested / Eliquis on hold / empiric Vit K / repeated / Worsening so far. 3. To extent also related to hepatorenal disease. 4. Recent PE / On anticoagulation / hold as INR high  5. Ca Lung with Mets  6. Hyponatremia   7. Anasarca   8. Hypoalbuminimia   9. Unable to take care of himself / Robert Albino / Prognosis not well / will need palliative care involved   10.  Will involve Hematology    Addendum-  OB +  Drop in HGB to 7.9  INR 6.3  Spoke to patient, Remains Full code  Dr Usman Heredia is his Oncologist / Consult changed  FFP    Electronically signed by Jovanni Andrea MD on 5/12/2019 at 9:43 AM

## 2019-05-13 NOTE — PROGRESS NOTES
10. H/o PE       1. Cr continues to get worse   2. Like worsening ATN / possibility of hepatorenal physiology trending in ? 3. Not candidate for biopsy since high inr/ thrombocytopenia low keagan etc   4. High risk vs poor dx benefit by biopsy   5. As of today no need for renal replacement   6. But at this rate will need soon if patient continues to get worse   7. hemonc needs to have serious discussion regarding prognosis   8. Goals of care to be established   9. uss abdomen and paracentesis for the time being / but not sure will be able with the high inr   10.  F/u closely

## 2019-05-13 NOTE — PROGRESS NOTES
Informed by family member that they will all meet with Hospice here at [facility] tomorrow.

## 2019-05-13 NOTE — CARE COORDINATION
Social Work/Discharge Planning:  Hospice consult noted. Met with patient and completed initial assessment. Explained Social Work role and discussed transition of care/discharge planning. Patient lives alone in a second floor apartment. Patient has a cane at home. Patient is active with [employer] [facility]. Discussed hospice consult and provided patient with hospice choice list.  Patient prefers [facility]. Referral made to [facility] and a representative will meet with patient and his son. Will continue to follow.   Electronically signed by FELICITA Toth on 5/13/2019 at 2:59 PM

## 2019-05-13 NOTE — PROGRESS NOTES
Tiffany Valdivia Hospitalist   Progress Note    Admitting Date and Time: 5/10/2019  5:06 PM  Admit Dx: Sepsis due to cellulitis ([address] [L03.90, A41.9]  Sepsis due to cellulitis ([address] [L03.90, A41.9]  JESSE (acute kidney injury) ([address] [N17.9]  JESSE (acute kidney injury) ([address] [N17.9]    Subjective:    Patient was admitted with Sepsis due to cellulitis ([address] [L03.90, A41.9]  Sepsis due to cellulitis ([address] [L03.90, A41.9]  JESSE (acute kidney injury) ([address] [N17.9]  JESSE (acute kidney injury) ([address] [N17.9]. Patient opens his eyes when name is called and even will answer questions appropriately but if not stimulated will fall back to sleep easily. Patient looked weak and frail. ROS: denies fever, chills, cp, sob, n/v, HA unless stated above.      sodium chloride  250 mL Intravenous Once    pantoprazole  40 mg Intravenous BID    bumetanide  1 mg Intravenous BID    midodrine  5 mg Oral TID WC    atorvastatin  40 mg Oral Nightly    doxycycline hyclate  100 mg Oral 2 times per day    metoprolol tartrate  12.5 mg Oral BID    multivitamin-iron-minerals-folic acid  1 tablet Oral Weekly    sodium chloride flush  10 mL Intravenous 2 times per day       oxyCODONE 10 mg Q4H PRN   calcium carbonate 750 mg TID PRN   sodium chloride flush 10 mL PRN   potassium chloride 40 mEq PRN   Or     potassium alternative oral replacement 40 mEq PRN   Or     potassium chloride 10 mEq PRN   magnesium sulfate 1 g PRN   magnesium hydroxide 30 mL Daily PRN   ondansetron 4 mg Q6H PRN   acetaminophen 650 mg Q4H PRN        Objective:    BP 98/61   Pulse 106   Temp 97.5 °F (36.4 °C) (Oral)   Resp 18   Ht 6' (1.829 m)   Wt 183 lb (83 kg)   SpO2 90%   BMI 24.82 kg/m²   General Appearance: in no acute distress, alert, frail-appearing and cachectic  Skin: warm and dry  Head: normocephalic and atraumatic  Eyes: pupils equal, round, and reactive to light and extraocular eye movements intact  ENT: hearing grossly normal bilaterally  Neck: neck supple and non tender without mass   Pulmonary/Chest: decreased breath sounds noted- throughout  Cardiovascular: normal rate, regular rhythm and intact distal pulses  Abdomen: Semi-firmly distended with bowel sounds noted and tender  Extremities: no cyanosis, no clubbing and 3+ pitting  edema-  bilateral lower legs  Musculoskeletal: no swollen joints, no joint deformity and no tender joints  Neurologic: no cranial nerve deficit and speech normal      Recent Labs     05/11/19  0653 05/12/19  0410 05/13/19  0420   * 131* 132   K 4.1 3.9  3.9 3.8  3.8   CL 94* 95* 94*   CO2 22 21* 21*   BUN 39* 40* 44*   CREATININE 2.1* 2.3* 2.7*   GLUCOSE 129* 91 89   CALCIUM 6.4* 6.5* 6.7*       Recent Labs     05/11/19  0653 05/12/19  0410 05/12/19  1307 05/13/19  0420   WBC 38.3* 34.2*  --  40.3*   RBC 2.98* 2.39*  --  2.33*   HGB 9.7* 7.9* 7.6* 7.5*   HCT 30.6* 24.6* 24.2* 24.5*   .7* 102.9*  --  105.2*   MCH 32.6 33.1  --  32.2   MCHC 31.7* 32.1  --  30.6*   RDW 17.6* 17.7*  --  18.2*   * 73*  --  70*   MPV 12.8* 13.0*  --  11.3       Radiology:   US ABDOMEN COMPLETE   Final Result      1. Findings are suggestive of hepatocellular disease. 2. Nonspecific thickened appearance of gallbladder wall. No evidence   of cholelithiasis. These findings can be associated with chronic   hepatocellular disease. 3. Ascites. XR FOOT RIGHT (2 VIEWS)   Final Result   1. Interval partial amputation of the right great toe.   2. Significant soft tissue edema of the dorsal right foot. US DUP LOWER EXTREMITIES BILATERAL VENOUS   Final Result      No evidence for deep vein thrombosis of the lower extremities. Diffuse fluid throughout the subcutaneous tissues compatible with   third spacing or cellulitis.           Assessment:    Active Problems:    Severe protein-calorie malnutrition (HCC)    Sepsis due to cellulitis ([address]    JESSE (acute kidney injury) ([address]    Status post amputation of right great toe (HCC)    Lactic acidosis    Elevated liver enzymes    Other ascites    Prolonged INR    Hyponatremia  Resolved Problems:    * No resolved hospital problems. *      Plan:  1. Sepsis secondary to Cellulitis - status post right great toe amputation - continued antibiotics - afebrile - leukocytosis is worse - will monitor closely  2. Acute kidney injury - nephrology following - BUN/creatinine slightly worse  3. Ascites - elevated liver enzymes - low-dose Bumex continue  4. Hyponatremia - improved - will monitor fluid intake  5. Stage IV squamous cell lung cancer - was treated with carpal Taxol, Opdivo, now being treated with Meghan Fears which is on hold at this time - prognosis is poor per oncology  6. Severe protein calorie malnutrition - most likely due to poor oral intake and generalized weakness secondary to disease process  7. Prolonged INR - was given FFP  8. Acute on chronic anemia - of chronic disease (lung cancer)        Electronically signed by BENITO Connell CNP on 5/13/2019 at 2:10 PM   HOSPITALIST ATTENDING PHYSICIAN NOTE 5/13/2019 1727PM:    Details of the evaluation - subjective assessment (including medication profile, past medical, family and social history when applicable), examination, review of lab and test data, diagnostic impressions and medical decision making - performed by BENITO Connell CNP, were discussed with me on the date of service and I agree with clinical information herein unless otherwise noted. The patient has been evaluated by me personally prior to discharge. Pt reports no fevers, chills,n/v     Exam: heart reg at rate of 100,lungs cta, abd pos bs soft nt, ext neg for le edema. Pt alert and oriented  x3 and family presents. Discussed risks benefits of treatments. Pt decided to not do dialysis and chose to be a dnr-cc and hospice.      I agree with the assessment and plan of BENITO Connell CNP    Sepsis  Cellulitis  zen  Ascites  Elevated liver enzymes  Hyponatremia  Stage IV squamous cell ca  Severe protein calorie malnutrition  Acidosis  Thrombocytopenia      Electronically signed by Beryle Blotter, D.O.   Hospitalist  [facility] Hospitalist Service at [facility]

## 2019-05-13 NOTE — PROGRESS NOTES
Weakness B LEs. Pt sat edge of bed to work on trunk control. Declined attempt to stand. Patient education  Pt educated on PT objectives    Patient response to education:   Pt verbalized understanding Pt demonstrated skill Pt requires further education in this area   no   yes     Rehab potential is fair for reaching above PT goals. Pts/ family goals   1. None stated    Patient and or family understand(s) diagnosis, prognosis, and plan of care. PLAN  PT care will be provided in accordance with the objectives noted above. Whenever appropriate, clear delegation orders will be provided for nursing staff. Exercises and functional mobility practice will be used as well as appropriate assistive devices or modalities to obtain goals. Patient and family education will also be administered as needed. Frequency of treatments will be 2-5x/week x 5-7 days.     Thea PT  [id number]

## 2019-05-13 NOTE — PROGRESS NOTES
5/13/2019  12:19 PM      Nutrition Assessment    Type and Reason for Visit: Initial, Positive Nutrition Screen    Nutrition Recommendations: Continue current diet and ONS    Nutrition Assessment: Pt meets criteria for severe malnutrition AEB <75% needs met >1 month, subcutaneous fat and muscle loss and fluid accumulation. Pt has increased metabolic demand due to stage IV lung CA with bone mets and also demand to promote wound healing (s/p 2 wks ago toe amp) Current JESSE likely contributes to fluid retention as well. Pt does not like Ensure ONS, will trial Frozen ONS     Malnutrition Assessment:  · Malnutrition Status: Meets the criteria for severe malnutrition  · Context: Chronic illness  · Findings of the 6 clinical characteristics of malnutrition (Minimum of 2 out of 6 clinical characteristics is required to make the diagnosis of moderate or severe Protein Calorie Malnutrition based on AND/ASPEN Guidelines):  1. Energy Intake-Less than or equal to 75% of estimated energy requirement, Greater than or equal to 1 month    2. Weight Loss-Unable to assess,    3. Fat Loss-Mild subcutaneous fat loss, Triceps  4. Muscle Loss-Moderate muscle mass loss, Temples (temporalis muscle), Clavicles (pectoralis and deltoids)  5. Fluid Accumulation-Severe fluid accumulation(also relative to hepatic and renal components), Extremities  6.   Strength-Not measured    Nutrition Risk Level: High    Nutrient Needs:  · Estimated Daily Total Kcal:  (MSJ x 1.3)  · Estimated Daily Protein (g): 100-115 (1.2-1.4 g/kg CBW - as aaron with JESSE)  · Estimated Daily Total Fluid (ml/day): per Renal    Nutrition Diagnosis:   · Problem: Severe malnutrition, In context of chronic illness  · Etiology: related to Insufficient energy/nutrient consumption, Catabolic illness     Signs and symptoms:  as evidenced by Diet history of poor intake, Intake 0-25%, Moderate muscle loss, Mild loss of subcutaneous fat, Localized or generalized fluid accumulation, Nausea    Objective Information:  · Nutrition-Focused Physical Findings: poor appetite, nausea, very weak, +4 edema, +I/O,    · Wound Type:  Wound Consult Pending(consult for sacral wound)  · Current Nutrition Therapies:  · Oral Diet Orders: Cardiac   · Oral Diet intake: 1-25%  · Anthropometric Measures:  · Ht: 6' (182.9 cm)   · Current Body Wt: 183 lb (83 kg)(5/13 bedwt)  · Admission Body Wt: 184 lb (83.5 kg)  · Usual Body Wt: 160 lb (72.6 kg)(per EMR x 6 months)  · % Weight Change:  ,  Unable to assess-as +4 edema likely masking actual wt loss   · Ideal Body Wt: 178 lb (80.7 kg), % Ideal Body 103%  · BMI Classification: BMI 18.5 - 24.9 Normal Weight    Nutrition Interventions:   Continue current diet, Start ONS  Continued Inpatient Monitoring, Education Not Indicated, Coordination of Care    Nutrition Evaluation:   · Evaluation: Goals set   · Goals: Pt tolerate diet and ONS with at least 50% intake, stable dry wt, promote wound healing    · Monitoring: Meal Intake, Supplement Intake, Skin Integrity, Wound Healing, I&O, Mental Status/Confusion, Weight, Pertinent Labs, Nausea or Vomiting      Electronically signed by Roshan Dominguez RD, CNSC, LD on 5/13/19 at 12:19 PM    Contact Number: [phone]

## 2019-05-13 NOTE — PROGRESS NOTES
5/13/2019 9:49 AM   [facility]     Subjective:     Serina Betancourt is a 59year old gentleman of the [facility] with stage IV squamous cell lung cancer progressed after carbo-taxol, opdivo and now on gemzar but has been put on hold due to recent toe amputation. Admitted with anemia, occult + stool, supratherapeutic INR and declining performance status, hypocalcemia.         Patient admitted with sepsis secondary cellulitis with acute kidney injury as well as anemia and thrombocytopenia    Current meds:  0.9 % sodium chloride bolus Once   oxyCODONE (ROXICODONE) immediate release tablet 10 mg Q4H PRN   pantoprazole (PROTONIX) injection 40 mg BID   calcium carbonate (TUMS) chewable tablet 750 mg TID PRN   bumetanide (BUMEX) injection 1 mg BID   midodrine (PROAMATINE) tablet 5 mg TID WC   atorvastatin (LIPITOR) tablet 40 mg Nightly   doxycycline hyclate (VIBRAMYCIN) capsule 100 mg 2 times per day   metoprolol tartrate (LOPRESSOR) tablet 12.5 mg BID   multivitamin-iron-minerals-folic acid (CENTRUM) chewable tablet 1 tablet Weekly   sodium chloride flush 0.9 % injection 10 mL 2 times per day   sodium chloride flush 0.9 % injection 10 mL PRN   potassium chloride (KLOR-CON M) extended release tablet 40 mEq PRN   Or    potassium bicarb-citric acid (EFFER-K) effervescent tablet 40 mEq PRN   Or    potassium chloride 10 mEq/100 mL IVPB (Peripheral Line) PRN   magnesium sulfate 1 g in dextrose 5% 100 mL IVPB PRN   magnesium hydroxide (MILK OF MAGNESIA) 400 MG/5ML suspension 30 mL Daily PRN   ondansetron (ZOFRAN) injection 4 mg Q6H PRN   acetaminophen (TYLENOL) tablet 650 mg Q4H PRN     Todays labs:  Recent Labs     05/11/19  0653 05/12/19  0410 05/12/19  1307 05/13/19  0420   WBC 38.3* 34.2*  --  40.3*   HGB 9.7* 7.9* 7.6* 7.5*   * 73*  --  70*   BUN 39* 40*  --  44*   CREATININE 2.1* 2.3*  --  2.7*                                                     Objective:     Patient Vitals for the past 8 hrs:   BP Temp Temp src Pulse Resp SpO2 Weight   05/13/19 0800 98/61 97.5 °F (36.4 °C) Oral 106 18 90 % --   05/13/19 0500 -- -- -- -- -- -- 183 lb (83 kg)   Afebrile last 24 hours  PE: Weak and fatigued flat affect  No oral ulcers no thrush  Abdomen soft bowel sounds active        Impression:     Active Problems:    Severe protein-calorie malnutrition (HCC)    Sepsis due to [address]itis [address]    [facility]SE [facility]) [facility]    Status post amputation of right great toe (HCC)    Lactic acidosis    Elevated liver enzymes    Other ascites    Prolonged INR    Hyponatremia  Resolved Problems:    * No resolved hospital problems. *          Plan:      On full supportive care prognosis poor

## 2019-05-13 NOTE — PROGRESS NOTES
Received call back from patients son Elidia Dyer and he can meet tomorrow at 1400 regarding the hospice consult. I placed call to [facility] and left a message to see if she could meet at that time as well. Will follow up tomorrow.

## 2019-05-13 NOTE — PROGRESS NOTES
Received hospice consult and that patient wants son Woody Stein to be present for meeting. I placed call to Woody Stein and received a voicemail, left message. Spoke with ZOFIA Hart to update. Hailey states the patients mother is currently in the room. I explained that patient wants son present and awaiting a call back from the son. I asked if she could update patients mother that once I hear back from the son, I will contact Ana María Parker, patients mother to see if she is available to be part of the meeting. Awaiting call back from Woodyjayden Stein.

## 2019-05-13 NOTE — PROGRESS NOTES
Occupational Therapy  OCCUPATIONAL THERAPY INITIAL EVALUATION      Date:2019  Patient Name: Nikole Elizondo  MRN: [id number]  : 1955  Room: [address] [place] [address]    Evaluating OT: Breezy [id number]    [facility] Daily Activity Raw Score:       Recommended Adaptive Equipment: Continue to assess. Diagnosis: Sepsis due to cellulitis ([facility]) [L03.90, A41.9], JESSE (acute kidney injury) ([facility] [N17.9]   Pertinent Medical History: recent 1st toe amputation R foot (19), lung cancer, COPD, HTN, MI, CAD     Precautions: falls; bed alarm; skin integrity    Home Living: Patient lives alone in a one-floor home; patient's bedroom and bathroom are on the main living level. Patient is a questionable historian. Prior Level of Function (PLOF): Per patient, patient was needing assistance with ADLs, needing assistance with IADLs, and used a walker for functional mobility prior to this hospitalization. Patient noted that he had been experiencing increased difficulty with ADLs/IADLs recently. Patient is a questionable historian. Pain Level: Patient denied experiencing pain. Cognition: Patient alert and oriented grossly; confusion demonstrated occasionally throughout this session. Fair command follow demonstrated. Memory: Fair   Sequencing: Fair   Problem Solving: Fair   Judgement/Safety: Fair    Functional Assessment:   Initial Eval Status  Date: 2019 Treatment Status  Date:  Short Term Goals  Treatment Frequency: PRN   Feeding Min A  Setup   Grooming Mod A   (seated)  SBA  (seated)   UB Dressing Mod A  SBA   LB Dressing Dependent  Mod A - with use of AE, as needed/appropriate   Bathing Max A  Mod A - with use of AE/DME, as needed/appropriate   Toileting Dependent  Mod A   Bed Mobility  Supine-to-Sit: Max Ax2  Sit-to-Supine: Max Ax2  Min A   Functional Transfers Not assessed. Functional Mobility Not assessed.      Balance Sitting: Fair  (seated at EOB)  Good dynamic sitting balance during completion of ADLs and other functional tasks. Activity Tolerance Poor  Patient will demonstrate Good understanding and consistent implementation of energy conservation techniques and work simplification techniques into ADL/IADL routines. Visual/  Perceptual WFL     N/A        Strength: ROM: Additional Information:    R UE  4-/5  0 - 90 degrees of active shoulder flexion; distal AROM grossly WFL    L UE 4-/5  0 - 90 degrees of active shoulder flexion; distal AROM grossly WFL      Hearing: WFL  Sensation: No complaints of numbness/tingling in B UEs. Tone: WFL  Edema: Yes - B LEs    Comments/Treatment: Upon arrival, patient supine in bed. At end of session, patient supine in bed with call light and phone within reach, bed alarm activated, and all lines and tubes intact. Patient would benefit from continued skilled OT to increase safety and independence with completion of ADL/IADL tasks for functional independence and quality of life. Patient education provided regardin) potential benefits of participation in EOB/OOB activities. Patient indicated limited understanding. Eval Complexity: Low    Assessment of Current Deficits:   Functional Mobility ? ADLs ? Strength ? Cognition ? Functional Transfers  ? IADLs ? Safety Awareness ? Endurance ? Fine Motor Coordination ? Balance ? Vision/Perception ? Sensation ? Gross Motor Coordination ? ROM ? Delirium ? Motor Control ? Plan of Care:   ADL Retraining ? Equipment Needs ? Neuromuscular Re-Education ? Energy Conservation Techniques ? Functional Transfer Training ? Patient and/or Family Education ? Functional Mobility Training ? Environmental Modifications ? Cognitive Re-Training ? Compensatory Techniques for ADLs ? Splinting Needs ? Positioning to Improve Overall Function ? Therapeutic Activity ? Therapeutic Exercise  ? Visual/Perceptual: ?    Delirium Prevention/Treatment  ?   Other:  ?    Rehab Potential: Good for established goals. Patient / Family Goal: Patient did not state a goal.  Patient and/or family were instructed on functional diagnosis, prognosis/goals, and OT plan of care. Demonstrated limited understanding. Low complexity evaluation + 0 timed treatment minutes  Time Out: 1353    Evaluation time includes thorough review of current medical information, gathering information on past medical history/social history and prior level of function, completion of standardized testing/informal observation of tasks, assessment of data, and development of POC/Goals. Hayley Mcarthur, OTR/L  License Number: [id number]

## 2019-05-13 NOTE — PLAN OF CARE
Problem: Falls - Risk of:  Goal: Will remain free from falls  Description  Will remain free from falls  Outcome: Met This Shift     Problem: Risk for Impaired Skin Integrity  Goal: Tissue integrity - skin and mucous membranes  Description  Structural intactness and normal physiological function of skin and  mucous membranes.   Outcome: Met This Shift     Problem: Bleeding:  Goal: Will show no signs and symptoms of excessive bleeding  Description  Will show no signs and symptoms of excessive bleeding  Outcome: Met This Shift

## 2019-05-13 NOTE — PROGRESS NOTES
Physical Therapy    Facility/Department: [facility]  INTERMEDIATE [facility]      NAME: Serina Betancourt  : 1955  MRN: [id number]    Date of Service: 2019     Attempted to see pt for PT evaluation, pt declined at this time stating that is was too early.   Hanh Watson PT [id number]

## 2019-05-14 NOTE — PROGRESS NOTES
Patient became bradycardic in the 30's. Upon entering room to assess patient it was discovered that he was not breathing. Patient full code. CODE called. Prior to initiating chest compressions selvin Quintero stated that he did not was extraordinary measures taken. Code was cancelled. Dr. Gretel Sepulveda was notified of the cease of respirations and asystole. Selvin Quintero remains at bedside. Emotional support provided.

## 2019-05-27 NOTE — DISCHARGE SUMMARY
[facility] [place] Phys[place] Discharge Summary       No follow-up provider specified. Activity level: pt     Diet: No diet orders on file    Dispo:pt     Condition at discharge: pt         Patient ID:  Ada Oviedo  [id number]  6 y.o.  1955    Admit date: 5/10/2019    Discharge date and time:  2019  1:40 AM    Admission Diagnoses: Active Problems:    Severe protein-calorie malnutrition (HCC)    Sepsis due to cellulitis ([address]    JESSE (acute kidney injury) ([address]    Status post amputation of right great toe (HCC)    Lactic acidosis    Elevated liver enzymes    Other ascites    Prolonged INR    Hyponatremia    Cellulitis of right lower extremity  Resolved Problems:    * No resolved hospital problems. *      Discharge Diagnoses: Active Problems:    Severe protein-calorie malnutrition (HCC)    Sepsis due to cellulitis ([address]    JESSE (acute kidney injury) ([facility]    Status post amputation of right great toe (HCC)    Lactic acidosis    Elevated liver enzymes    Other ascites    Prolonged INR    Hyponatremia    Cellulitis of right lower extremity  Resolved Problems:    * No resolved hospital problems. *  Sepsis  Cellulitis  jesse  Ascites  Elevated liver enzymes  Hyponatremia  Stage IV squamous cell ca  Severe protein calorie malnutrition  Acidosis  Thrombocytopenia      Consults:  IP CONSULT TO NEPHROLOGY  IP CONSULT TO ONCOLOGY  IP CONSULT TO DIETITIAN  IP CONSULT TO HOSPICE    Procedures: echo  Summary   Left ventricular internal dimensions were normal in diastole and systole.   No regional wall motion abnormalities seen.   Normal left ventricular ejection fraction.   There is doppler evidence of stage I diastolic dysfunction.   The aortic valve appears mildly sclerotic.   Physiologic and/or trace tricuspid regurgitation.     Hospital Course: Patient was admitted with Sepsis due to cellulitis ([address] [L03.90, A41.9]  Sepsis due to cellulitis ([address] [L03.90, A41.9]  JESSE (acute kidney injury) ([address] [N17.9]  JESSE (acute kidney injury) ([address] [N17.9]. 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 No pain, no discharges, no pus leaking, there is small ulcer on the sacral area. On Discharge his WBC was 30. Compliant on meds. In ER  · JESSE Cr 2.2, noticed decline in urine output recently , Na 126 hyponatremia   · INR 5.5 no recent INR to compare with, pt is on eliquis 5 BID for recent finding of PE on last admission   · Albumin 1.7, INR 5.5, bilirubin 0.5, AST//123, RENNY      Denied any fever, night sweats,  chest pain, SOB, nausea, vomiting, diarrhea, abdominal pain, blood in stool or black stool. Pt seen and examined by consultants. Pt had further workup. Pt given diuretics. Pt did not respond well. Discussed case with both renal and hem/onc. Pt eventually requiring dialysis per renal and pt prognosis poor per hem/onc. Discussed risks and benefits with pt and family. Pt chose to be a dnr-cc and not do dialysis. Pt chose comfort measures and hospice. Pt . Discharge Exam:  Pt  before being seen  No intake/output data recorded. No intake/output data recorded. LABS:  No results for input(s): NA, K, CL, CO2, BUN, CREATININE, GLUCOSE, CALCIUM in the last 72 hours. No results for input(s): WBC, RBC, HGB, HCT, MCV, MCH, MCHC, RDW, PLT, MPV in the last 72 hours. No results for input(s): POCGLU in the last 72 hours. Imaging:   US ABDOMEN COMPLETE   Final Result      1. Findings are suggestive of hepatocellular disease. 2. Nonspecific thickened appearance of gallbladder wall. No evidence   of cholelithiasis.  These findings can be associated with chronic   hepatocellular disease. 3. Ascites. XR FOOT RIGHT (2 VIEWS)   Final Result   1. Interval partial amputation of the right great toe.   2. Significant soft tissue edema of the dorsal right foot. US DUP LOWER EXTREMITIES BILATERAL VENOUS   Final Result      No evidence for deep vein thrombosis of the lower extremities. Diffuse fluid throughout the subcutaneous tissues compatible with   third spacing or cellulitis. Patient Instructions:      Medication List      ASK your doctor about these medications    apixaban 5 MG Tabs tablet  Commonly known as:  ELIQUIS  Take 1 tablet by mouth 2 times daily Start after 3 days, once 10 mg dose is over     aspirin 81 MG tablet     atorvastatin 40 MG tablet  Commonly known as:  LIPITOR     doxycycline hyclate 100 MG capsule  Commonly known as:  VIBRAMYCIN  Take 1 capsule by mouth every 12 hours for 10 days  Ask about: Should I take this medication?      Fish Oil 1200 MG Caps     furosemide 40 MG tablet  Commonly known as:  LASIX     gabapentin 300 MG capsule  Commonly known as:  NEURONTIN     GEMZAR IV     metoprolol tartrate 25 MG tablet  Commonly known as:  LOPRESSOR     multivitamin-iron-minerals-folic acid chewable tablet     nitroGLYCERIN 0.4 MG SL tablet  Commonly known as:  NITROSTAT     oxyCODONE HCl 10 MG immediate release tablet  Commonly known as:  OXY-IR     ZOMETA IV                Signed:  Electronically signed by Deshawn Toussaint DO on 5/27/2019 at 1:40 AM n/a
